# Patient Record
(demographics unavailable — no encounter records)

---

## 2025-05-14 NOTE — PROCEDURE
[1000 Hz] : 1000 Hz [Normal Eardrum Mobility] : consistent with restricted eardrum mobility [Type B Tympanogram] : Type B Flat [] : Auditory Brainstem Response: [___dBnHL] : 4000 Hz: [unfilled] dBnHL [Threshold] : threshold [Natural Sleep] : natural sleep [ABR responses to ___/sec] : responses to [unfilled] /sec [de-identified] : * could not establish threshold above 70 dBnHL

## 2025-05-14 NOTE — PLAN
[FreeTextEntry2] : 1) Otologic evaluation re: middle ear status.  2) Repeat ABR evaluation scheduled for 7/1/25. 3) Further recommendations pending above.

## 2025-05-14 NOTE — BIRTH HISTORY
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
Right knee pain

## 2025-05-14 NOTE — ASSESSMENT
[FreeTextEntry1] : ABR is an an objective test that measures brainstem activity in response to acoustic stimuli. It evaluates the integrity of the hearing system from the level of the cochlea through the lower brainstem. From this, we are able to gather data to estimate hearing thresholds. Please note thresholds are reported in dBnHL. Diagnostic statement includes a correction factor of -20 dB at 500Hz. -15 dB at 1000Hz, -10 dB at 2000Hz and -5 dB at 4000Hz.  RESULTS Left ear: Estimated hearing thresholds obtained WNL to slight/mild hearing loss 2-4kHz Right ear: Estimated thresholds obtained in moderate hearing loss range at 2kHz and at least severe range for 4kHz.   Unable to complete bone conduction testing as patient awoke. Abnormal immittance results, cannot rule out conductive/mixed hearing loss.   Results discussed with parents who expressed understanding.

## 2025-05-14 NOTE — REASON FOR VISIT
[Initial] : initial visit for [Other: _____] : [unfilled]  [FreeTextEntry1] : [facility] [Parents] : parents [Medical Records] : medical records

## 2025-05-16 NOTE — BEGINNING OF VISIT
[Mother] : mother [Father] : father [] :  [Language Line ] : provided by [employer]   [Time Spent: ____ minutes] : Total time spent using  services: [unfilled] minutes. The patient's primary language is not English thus required  services.

## 2025-05-17 NOTE — HISTORY OF PRESENT ILLNESS
[Mother] : mother [Father] : father [Normal] : Normal [___ voids per day] : [unfilled] voids per day [Frequency of stools: ___] : Frequency of stools: [unfilled]  stools [per day] : per day. [Green/brown] : green/brown [In Bassinet/Crib] : sleeps in bassinet/crib [On back] : sleeps on back [Co-sleeping] : no co-sleeping [Loose bedding, pillow, toys, and/or bumpers in crib] : no loose bedding, pillow, toys, and/or bumpers in crib [Pacifier use] : Pacifier use [No] : No cigarette smoke exposure [Exposure to electronic nicotine delivery system] : No exposure to electronic nicotine delivery system [Rear facing car seat in back seat] : Rear facing car seat in back seat [Carbon Monoxide Detectors] : Carbon monoxide detectors at home [Smoke Detectors] : Smoke detectors at home. [de-identified] : parents complain of bad smell when he passes gas [NO] : No [FreeTextEntry7] : Birth Hx and NCIU course:  Born via  due to non-reassuring fetal status and severe intrauterine growth restriction, was admitted to the NICU at birth with complications including hypoxia requiring intubation and vasopressor support. Initial findings included leukopenia, anemia, thrombocytopenia, coagulopathy, brain parenchymal hemorrhage, and marked direct hyperbilirubinemia. A broad workup for liver dysfunction--including infectious, metabolic, and genetic etiologieswas unrevealing, though whole exome sequencing was sent. Imaging ultimately identified two intrahepatic portosystemic shunts, which were successfully embolized on hospital day . Following embolization, there was improvement in liver function tests, coagulopathy, and ammonia levels. Patient was found to have essential fatty acid deficiency, carnitine deficiency. The patient was discharged on  with a feeding plan that included Nestle Extensive HA and expressed human milk, along with ursodiol, a multivitamin, and soybean oil for essential fatty acid supplementation   Meds: Ursodiol, levocarnitine and poly-vi-sol [FreeTextEntry1] : BIRTH HX and NICU COURSE: Date/Time of Admission	17-Mar-2025 10:21 Admission Weight (KILOGRAMS)	1.575 kg Admission Weight (GRAMS)	1575 Gm Date/Time of Birth	09-Mar-2025 Admission Weight (POUNDS)	3 Admission Weight (OUNCES)	7.556 Ounce(s) Admission Height (CENTIMETERS)	42.5 cm Admission Height (INCHES)	16.73 Inch(s) Gestational Age at Birth (WEEKS)	36.6 Week(s) Infant Measurement	Small for gestational age (SGA)  Hospital Course	 OSH: Patient born at 36.6 via C/S to a 32 y.o.  mom. Mom received prenatal care at [facility]. Mom is B+. RPR NR, HIV negative, HBsAg negative, Rubella imm. GC/Chlamydia negative, GBS negative. Mom admitted to L&D for decreased fetal movement of 2 days. BPP 2/10, NRNST. PAtient with FGR <1%. Fluids were meconium stained. Baby emerged limp and apneic. Baby given PPV for apnea and HR <100. Patient then given CPAP 5/40%. Sepsis workup was done and antibiotics were started. Patient intubated at 2HOL. Patient started on SIMV then transitioned to HFOV. Rsoie 20 ppm started on 3/10 and stopped on 3/11. Curosurf given at 3HOL. Dopamine started on 3/9 and stopped on 3/11. Baby extubated to CPAP on 3/11. Transferred on CPAP 6/23%. Patient's HUS on 3/10 showed a focal increased echogenicity in the L occipital region measuring 2x1.3x1.7 cm. HUS on 3/11 showed evidence of intraparenchymal hemorrhage seen in L occipital region. Patient's initial CBC with platlets of 19 for which platelets were given. At 4HOL, hct of 38.9 for which pRBC was given. An additional dose of Vit K given due to excess bleeding at time of UAL insertion. Protein C of 7 and S of 13. Initial wbc of 12 decreased to 2 on 3/10 for which filgrastim was given. Patient given 2nd pRBC on 3/17 for hct of 33.8. Baby started on OGT feeds on 3/13. Feeds stopped on 3/12 for abdominal distention. Flagyl added due to guaiac positive stools. Abdominal xray show no pneumatosis or free air. Patient's initial Dbili of 0.4 increasing daily until it was 15.9 on 3/17. Phototherapy discontinued on 3/17. Albumin given on 3/10 and 3/12. IL never started. The initial BCx was negative. Baby on abx since birth. Amp day 9 of 10, Ceftazidime day 9 of 10, Flagy day 4 since 3/13. Total IgM <25, CMV urine negative, Toxo IgM <8. HepB vaccine given. 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  [facility] COURSE:  GA 36w6d transferred from [facility] for need for hematology, hepatology, and likely other subspecialty expertise Severe FGR; symmetric SGA (CMV and Toxo ruled out) H/o intubation for presumed PPHN L occipital echogenicity suspected to be hemorrhage on head US  Thrombocytopenia, coagulopathy work up notable for elevated INR, protein C and S at [facility] that were reportedly low. Worsening direct hyperbilirubinemia Empiric broad spectrum antibiotics with symptoms focalized to the abdomen however not ruled in for NEC at [facility]    Patient transferred to [facility] NICU on 3/17 for escalation of care and access to in-house subspecialties. Patient currently on nasal IMV 15/6 with PS of 10 at 21%.   *****************************************  Respiratory: Currntly stable on RA since 4/15.  Intubated for OR, CMV PCAC R  25 16/ 21%. Continuous cardiorespiratory monitoring for risk of apnea of prematurity and associated bradycardia.  s/p  BCPAP 6 since 3/18, s/p NIMV on admission 3/17   CV: Hemodynamically stable.  Portosystemic shunt s/p embolization (see Liver).  Echo 4/10 showed small PDA, stretched PFO, no evidence of PPHN, but with evidence of peripheral pulmonary stenosis Echo 3/10 at [facility] showed moderate PPHN, PFO, and small PDA with good L systolic function. H/o intubation for initial critical illness due to presumed PPHN required maximum HFOV, Rosie, dopamine; extubated and off Rosie, dopamine 3/11. Access: none  GI/Liver: Elevated direct bilirubin, LFTs, coagulopathy in setting of portosystemic shunt - liver function now improving s/p embolization, on ursodiol, lactulose. Per Hepatology, can d/c lactulose and discharge home on ursodiol. For follow-up on . 4/10 portosystemic shunt visualized on US Doppler s/p  intervention with IR, closure on . On Ursodiol. Started lactulose on  for high ammonia, dose increased on .   Coags  normal for age.    Hepatology recs appreciated; consulted for concern for liver failure in setting of coagulopathy, thrombocytopenia, direct hyperbilirubinemia. AST/ALT trending up, Dbili/GGT trending down - continue to monitor weekly. UA showed crystals S/p Vitamin K once daily for 3 days (3/17-).  Etiology per Peds GI/hepatology - likely ischemic event vs genetic challenge Labs:  ferritin 1785 (low suspicion for GALD) alpha fetoprotein 90860 (wnl for ; discussed with hepatology) fibrinogen 118, 127 D-dimer 3032 Triglycerides 124 (wnl) Protein C and Protein S activity ordered 3-26, compare to values at [facility] Complete abdominal US with doppler 3/17 showed unremarkable hepatic vasculature, gallbladder and common bile duct.   FEN: Feeding FEHM/Nestle Extensive HA/Neosure 22kcal: ad bobbi, taking 40-70 mL q3hrs. On soybean oil 1mL q6h with feeds for essential fatty acid deficiency on 3/28. Enteral carnitine supplementation started 3/28.   Nutritional insufficiencies, essential fatty acid deficiency and carnitine deficiency on enteral supplements as of 3/28. Occasional emesis.  s/p D10 TPN: TF 25. Recent Lytes 3/28 - acceptable and stable. Essential FA's returned abnormal on 3-25 see nutritionist note. Added ursodiol on 3-26 for direct hyperbili   Heme: Monitor for anemia and thrombocytopenia.  Hct 32%. 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 Will repeat G6PD Level consulted in setting of cerebral parenchymal hemorrhage for diagnostic recommendation for coagulopathy and treatment if applicable.  Coagulopathy c/w consumption, not an inherited disorder is current working dx. Given brain bleed, would transfuse platelets PRN for goal > 50k.    G6PD pending. PRBC transfusion for Hct 25 on .   PRBC transfusion for Hct 22.4 (3/31).  Plt transfusion: 3/17  Coagulopathy workup:   Rpt. Coag WNL ( PT 16.4/PTT46.9 )INR 1.38. Low concern for rapidly progressive coagulopathy.  [x] CBC/diff, reticulocyte count [x] PT, aPTT, fibrinogen, thrombin time, mixing studies, vWD panel (VW antigen, VW factor activity, FVIII assay, blood type) sent 3/18. PT prolonged/corrects on mixing studies. PTT not significantly prolonged per Heme; [X] 3/20 sent obtain factors II, V, VII, X - all low per lab references however deemed acceptable for age per discussion with Hematology [facility] Course:  ID: Monitor for signs and symptoms of sepsis. IT 0.2 on 3/28, incidental finding, no signs or symptoms of sepsis.  Low threshold for a rule out sepsis.  Current:  No tx. Previous tx's: Empiric broad spectrum antibiotics with symptoms focalized to the abdomen however not ruled in for NEC at [facility]   Ampicillin, ceftazidime to end 3/18 after 10-day empiric course started at [facility]; blood culture NGTD Flagyl to end 3/20 after 7-day empiric course started in setting of abdominal distension, Guaiac+ stool cmv pcr, urine, HSV pcr, BCx, UCx,   Neuro: L occipital hemorrhage suggested by echogenicity on head US.  MRI  imaging 3-21 pm. No need for repeat MRI prior to discharge. Outpatient Neurology f/u 1 mo from discharge; NSGY f/u 2 weeks from discharge.  MRI brain w/wo contrast, MRA head and neck w contrast - see report - significant old bleeding and white matter injury evident Neurology recs appreciated. Consult Neurosurgery.- see notes. No advancing hemorrhage, no hydrocephalus Serial HUS's, last  with improved left occipital hemorrhage   Audiology: Failed in right ear, CMV negative x2.   Genetics/Metabolic: consulted for diagnostic recommendation. 3/19 sent: - THEA on 3-19 Results pending - biochem workup including: plasma AA, acylcarnitine panel, urine organic acid, peroxisomal fatty acids    sent 3-18; Results...3-, tyrosine elevated, see report on f/u labs _____________ - Sent carbohydrate deficiency transferrin (3/31) - Sent TALDO - Polyols, Quantitative Urine. ()  Social:   Family updated . Family updated using Creole  (4/3)  Scheduled family meeting for 1600 for multispecialty discussion.[id number] [facility] creole Dr Álvarez, Prabha *Peds Neuro; Dr CALLEJAS---Palliative care, ; Dr. Fernando Agarwal Neonatology, [facility], Start 1645, end 1756 hrs.  Labs/Imaging/Studies:  essential fatty acid panel, LFTs, GGT, total and direct bili. Repeat Brain MRI week of . Follow up essential fatty acid panel.   Plan: Cleared for discharge home by Hepatology, Neurology, Neurosurgery and Hematology as of  with close outpatient follow-up. Meds to be sent and parents need teaching. Possible d/c home  pending family receipt of medication and med teaching.   CCHD Screen []: Initial Pre-Ductal SpO2(%): 98 Post-Ductal SpO2(%): 97 SpO2 Difference(Pre MINUS Post): 1 Extremities Used: Right Hand, Left Foot Result: Passed Follow up: Normal Screen- (No follow-up needed) Screen#: [id number] Screen Date: 12-Mar-2025 Screen Comment: repeated 3/13/25; #[id number] Car seat test passed: yes Car seat test date: 2025 Car seat test comments: passed - no desaturations or bradycardia. Safety 1st car seat   Right ear hearing screen completed date: 2025 Right ear screen method: ABR (auditory brainstem response) Right ear screen result: Failed Right ear screen comment: Audiology to schedule outpatient followup  Left ear hearing screen completed date: 2025 Left ear screen method: ABR (auditory brainstem response) Left ear screen result: Passed Left ear screen comments: N/A    Theodore is a 2 month old ex-36.6 week male with extensive NICU admission presenting for his first [facility]. Today parents report that they are most concerned with his umbilical hernia that seems to be worsening. They report that they went to the emergency department on  and was sent home and told to follow up with the pediatrician. In addition, they are concerned that Theodore is very small and he is always hungry.   For feeds, he takes 180 mL every 3 hours. They report that he often wants more but they are hesitant to feed him more as when he was in the NICU he took a lot less. They also endorse being concerned that they will run out of the formula very soon. They are mixing the formula as taught in the NICU (250 mL water with 10 scoops of nestle Extensive HA formula 24 kcal with soybean oil in every other feed).   Mom reports that she is concerned because Theodore does not sleep much at all and in turn her and father of child do not sleep much either. They say the longest stretch he has slept since being home from the NICU is 1 hour, typically from 4-5 am. Otherwise he is very fussy and cries often. They feel that this pain is from the hernia because when he cries it enlarges.   Mom endorses post partum depression in addition to lack of sleep. She reports that she cries very often but has no intention of harming herself or Theodore. She reports feeling a significant amount of guilt every time Theodore cries.   They report that he has 2-3 green bowel movements a day and passes foul smelling gas. He has 8-10 wet diapers per day. They endorse that he sleeping on his back in a crib but report that he uses a pillow in the crib.

## 2025-05-17 NOTE — PHYSICAL EXAM
[Alert] : alert [Consolable] : consolable [Normocephalic] : normocephalic [Flat Open Anterior [place]] : flat open anterior fontanelle [PERRL] : PERRL [Red Reflex Bilateral] : red reflex bilateral [Symmetric Light Reflex] : symmetric light reflex [Normally Placed Ears] : normally placed ears [Auricles Well Formed] : auricles well formed [Bony landmarks visible] : bony landmarks visible [Nares Patent] : nares patent [Palate Intact] : palate intact [Supple, full passive range of motion] : supple, full passive range of motion [Symmetric Chest Rise] : symmetric chest rise [Clear to Auscultation Bilaterally] : clear to auscultation bilaterally [Regular Rate and Rhythm] : regular rate and rhythm [S1, S2 present] : S1, S2 present [+2 Femoral Pulses] : +2 femoral pulses [Soft] : soft [Bowel Sounds] : bowel sounds present [Normal external genitailia] : normal external genitalia [Central Urethral Opening] : central urethral opening [Testicles Descended Bilaterally] : testicles descended bilaterally [Patent] : patent [Normally Placed] : normally placed [Symmetric Flexed Extremities] : symmetric flexed extremities [Startle Reflex] : startle reflex present [Suck Reflex] : suck reflex present [Rooting] : rooting reflex present [Palmar Grasp] : palmar grasp reflex present [Plantar Grasp] : plantar grasp reflex present [Symmetric [place]] : symmetric [place] [Dermal Melanocytosis] : Dermal Melanocytosis [Acute Distress] : no acute distress [Conjunctivae with no discharge] : conjunctivae with discharge [Excess Tearing] : no excessive tearing [Discharge] : no discharge [Palpable Masses] : no palpable masses [Murmurs] : no murmurs [Tender] : nontender [Circumcised] : not circumcised [Aguilar-Ortolani] : negative Aguilar-Ortolani [Spinal Dimple] : no spinal dimple [Tuft of Hair] : no tuft of hair [Rash and/or lesion present] : no rash/lesion [FreeTextEntry1] : small appearing 2 month old male [FreeTextEntry5] : +icteric sclera [FreeTextEntry9] : + distended abdomen, large umbilical hernia that is reducible, no hepatosplenomegaly appreciated  [FreeTextEntry6] : +left ingluinal hernia

## 2025-05-17 NOTE — DISCUSSION/SUMMARY
[FreeTextEntry1] : Theodore is a 2 month old male with extensive NICU stay. He has gained 15 g/day since he was discharged from the hospital from his second hospitalization. Parents state they are low on special formula required for this infant's metabolic illness and receiving wrong WIC form at discharge. WIC form provided for family to receive more formula. Proper preparation reviewed in detail.  Parents have been having difficulty coordinating follow up appointments with the many specialties that he requires. [facility] referral submitted. He requires follow up with several specialists at this time including hepatology, surgery, neurosurgery, genetics, neurology, neurodevelopmental, otolaryngology, audiology, hematology and NICU grad clinic. Only a couple of these were scheduled at the time of discharge. Since being discharged from the hospital the family was unable to attend the hepatology appointment because father's work schedule and mom is unable to attend appointments alone. This appointment was never rescheduled.   [facility] survey indicating family requesting assistance with , have worries about housing, need assistance obtaining more formula, SNAP and cash assistance. Patient navigator provided family resources to assist them.   connected with patients as well and initiated contact for assistance as well. Provided family with resources for mom's depression and sadness. Mom denies any thoughts of harming herself or others.  Discussed the importance of safe sleep guidelines and informed the family of the dangers of co-sleeping and sleeping with a pillow at this age.  Discussed with parents that the umbilical and inguinal hernias are both reducible and don't seem to be causing any pain when being reduced. Parents insist he be seen soon for hernia repair.  Discussed that Theodore needs to be fed and increased intervals and ideally should be fed ad bobbi to optimize his growth as well as aid in fussiness and assist with sleep, all likely in the setting of not being full.  Theodore failed both hearing screens at birth.  He has follow up with nadia and hearing and speech.   Theodore has many specialities of which care coordination is required. Hepatology and surgery appointments will be prioritized, as Theodore continues to have icteric sclera and have been unable to attend their hepatology appointment and surgery because of the families concerns for the hernia.  Follow up in 1-2 weeks to discuss 2 month vaccines and continue to coordinate care for Theodore. He did not receive first dose of HepB or any 2 month vaccines prior to discharge.  Will tailor vaccine schedule to include this dose.

## 2025-05-17 NOTE — DISCUSSION/SUMMARY
[FreeTextEntry1] : Theodore is a 2 month old male with extensive NICU stay. He has gained 15 g/day since he was discharged from the hospital from his second hospitalization. Parents state they are low on special formula required for this infant's metabolic illness and receiving wrong WIC form at discharge. WIC form provided for family to receive more formula. Proper preparation reviewed in detail.  Parents have been having difficulty coordinating follow up appointments with the many specialties that he requires. [facility] referral submitted. He requires follow up with several specialists at this time including hepatology, surgery, neurosurgery, genetics, neurology, neurodevelopmental, otolaryngology, audiology, hematology and NICU grad clinic. Only a couple of these were scheduled at the time of discharge. Since being discharged from the hospital the family was unable to attend the hepatology appointment because father's work schedule and mom is unable to attend appointments alone. This appointment was never rescheduled.   [facility] survey indicating family requesting assistance with , have worries about housing, need assistance obtaining more formula, SNAP and cash assistance. Patient navigator provided family resources to assist them.   connected with patients as well and initiated contact for assistance as well. Provided family with resources for mom's depression and sadness. Mom denies any thoughts of harming herself or others.  Discussed the importance of safe sleep guidelines and informed the family of the dangers of co-sleeping and sleeping with a pillow at this age.  Discussed with parents that the umbilical and inguinal hernias are both reducible and don't seem to be causing any pain when being reduced. Parents insist he be seen soon for hernia repair.  Discussed that Theodore needs to be fed and increased intervals and ideally should be fed ad bobbi to optimize his growth as well as aid in fussiness and assist with sleep, all likely in the setting of not being full.  Theodore failed both hearing screens at birth.  He has follow up with nadia and hearing and speech.   Theodore has many specialities of which care coordination is required. Hepatology and surgery appointments will be prioritized, as Theodore continues to have icteric sclera and have been unable to attend their hepatology appointment and surgery because of the families concerns for the hernia.  Follow up in 1-2 weeks to discuss 2 month vaccines and continue to coordinate care for Theodore. He did not receive first dose of HepB or any 2 month vaccines prior to discharge.  Will tailor vaccine schedule to include this dose.

## 2025-05-17 NOTE — PHYSICAL EXAM
[Alert] : alert [Consolable] : consolable [Normocephalic] : normocephalic [Flat Open Anterior [place]] : flat open anterior fontanelle [PERRL] : PERRL [Red Reflex Bilateral] : red reflex bilateral [Symmetric Light Reflex] : symmetric light reflex [Normally Placed Ears] : normally placed ears [Auricles Well Formed] : auricles well formed [Bony landmarks visible] : bony landmarks visible [Nares Patent] : nares patent [Palate Intact] : palate intact [Supple, full passive range of motion] : supple, full passive range of motion [Symmetric Chest Rise] : symmetric chest rise [Clear to Auscultation Bilaterally] : clear to auscultation bilaterally [Regular Rate and Rhythm] : regular rate and rhythm [S1, S2 present] : S1, S2 present [+2 Femoral Pulses] : +2 femoral pulses [Soft] : soft [Bowel Sounds] : bowel sounds present [Normal external genitailia] : normal external genitalia [Central Urethral Opening] : central urethral opening [Testicles Descended Bilaterally] : testicles descended bilaterally [Patent] : patent [Normally Placed] : normally placed [Symmetric Flexed Extremities] : symmetric flexed extremities [Startle Reflex] : startle reflex present [Suck Reflex] : suck reflex present [Rooting] : rooting reflex present [Palmar Grasp] : palmar grasp reflex present [Plantar Grasp] : plantar grasp reflex present [Symmetric Zeny] : symmetric [place] [Dermal Melanocytosis] : Dermal Melanocytosis [Acute Distress] : no acute distress [Conjunctivae with no discharge] : conjunctivae with discharge [Excess Tearing] : no excessive tearing [Discharge] : no discharge [Palpable Masses] : no palpable masses [Murmurs] : no murmurs [Tender] : nontender [Circumcised] : not circumcised [Aguilar-Ortolani] : negative Aguilar-Ortolani [Spinal Dimple] : no spinal dimple [Tuft of Hair] : no tuft of hair [Rash and/or lesion present] : no rash/lesion [FreeTextEntry1] : small appearing 2 month old male [FreeTextEntry5] : +icteric sclera [FreeTextEntry9] : + distended abdomen, large umbilical hernia that is reducible, no hepatosplenomegaly appreciated  [FreeTextEntry6] : +left ingluinal hernia

## 2025-05-17 NOTE — DEVELOPMENTAL MILESTONES
[Calms when picked up or spoken to] : calms when picked up or spoken to [Looks briefly at objects] : looks briefly at objects [Alerts to unexpected sound] : alerts to unexpected sound [Holds fingers more open at rest] : holds fingers more open at rest [None] : none [Smiles responsively] : smiles responsively [Opens and shuts hands] : opens and shuts hands [Vocalizes with simple cooing] : does not vocalize with simple cooing [Lifts head and chest in prone] : does not lift head and chest in prone

## 2025-05-17 NOTE — HISTORY OF PRESENT ILLNESS
[Mother] : mother [Father] : father [Normal] : Normal [___ voids per day] : [unfilled] voids per day [Frequency of stools: ___] : Frequency of stools: [unfilled]  stools [per day] : per day. [Green/brown] : green/brown [In Bassinet/Crib] : sleeps in bassinet/crib [On back] : sleeps on back [Co-sleeping] : no co-sleeping [Loose bedding, pillow, toys, and/or bumpers in crib] : no loose bedding, pillow, toys, and/or bumpers in crib [Pacifier use] : Pacifier use [No] : No cigarette smoke exposure [Exposure to electronic nicotine delivery system] : No exposure to electronic nicotine delivery system [Rear facing car seat in back seat] : Rear facing car seat in back seat [Carbon Monoxide Detectors] : Carbon monoxide detectors at home [Smoke Detectors] : Smoke detectors at home. [de-identified] : parents complain of bad smell when he passes gas [NO] : No [FreeTextEntry7] : Birth Hx and NCIU course:  Born via  due to non-reassuring fetal status and severe intrauterine growth restriction, was admitted to the NICU at birth with complications including hypoxia requiring intubation and vasopressor support. Initial findings included leukopenia, anemia, thrombocytopenia, coagulopathy, brain parenchymal hemorrhage, and marked direct hyperbilirubinemia. A broad workup for liver dysfunction--including infectious, metabolic, and genetic etiologieswas unrevealing, though whole exome sequencing was sent. Imaging ultimately identified two intrahepatic portosystemic shunts, which were successfully embolized on hospital day . Following embolization, there was improvement in liver function tests, coagulopathy, and ammonia levels. Patient was found to have essential fatty acid deficiency, carnitine deficiency. The patient was discharged on  with a feeding plan that included Nestle Extensive HA and expressed human milk, along with ursodiol, a multivitamin, and soybean oil for essential fatty acid supplementation   Meds: Ursodiol, levocarnitine and poly-vi-sol [FreeTextEntry1] : BIRTH HX and NICU COURSE: Date/Time of Admission	17-Mar-2025 10:21 Admission Weight (KILOGRAMS)	1.575 kg Admission Weight (GRAMS)	1575 Gm Date/Time of Birth	09-Mar-2025 Admission Weight (POUNDS)	3 Admission Weight (OUNCES)	7.556 Ounce(s) Admission Height (CENTIMETERS)	42.5 cm Admission Height (INCHES)	16.73 Inch(s) Gestational Age at Birth (WEEKS)	36.6 Week(s) Infant Measurement	Small for gestational age (SGA)  Hospital Course	 OSH: Patient born at 36.6 via C/S to a 32 y.o.  mom. Mom received prenatal care at [facility]. Mom is B+. RPR NR, HIV negative, HBsAg negative, Rubella imm. GC/Chlamydia negative, GBS negative. Mom admitted to L&D for decreased fetal movement of 2 days. BPP 2/10, NRNST. PAtient with FGR <1%. Fluids were meconium stained. Baby emerged limp and apneic. Baby given PPV for apnea and HR <100. Patient then given CPAP 5/40%. Sepsis workup was done and antibiotics were started. Patient intubated at 2HOL. Patient started on SIMV then transitioned to HFOV. Rosie 20 ppm started on 3/10 and stopped on 3/11. Curosurf given at 3HOL. Dopamine started on 3/9 and stopped on 3/11. Baby extubated to CPAP on 3/11. Transferred on CPAP 6/23%. Patient's HUS on 3/10 showed a focal increased echogenicity in the L occipital region measuring 2x1.3x1.7 cm. HUS on 3/11 showed evidence of intraparenchymal hemorrhage seen in L occipital region. Patient's initial CBC with platlets of 19 for which platelets were given. At 4HOL, hct of 38.9 for which pRBC was given. An additional dose of Vit K given due to excess bleeding at time of UAL insertion. Protein C of 7 and S of 13. Initial wbc of 12 decreased to 2 on 3/10 for which filgrastim was given. Patient given 2nd pRBC on 3/17 for hct of 33.8. Baby started on OGT feeds on 3/13. Feeds stopped on 3/12 for abdominal distention. Flagyl added due to guaiac positive stools. Abdominal xray show no pneumatosis or free air. Patient's initial Dbili of 0.4 increasing daily until it was 15.9 on 3/17. Phototherapy discontinued on 3/17. Albumin given on 3/10 and 3/12. IL never started. The initial BCx was negative. Baby on abx since birth. Amp day 9 of 10, Ceftazidime day 9 of 10, Flagy day 4 since 3/13. Total IgM <25, CMV urine negative, Toxo IgM <8. HepB vaccine given. 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  [facility] COURSE:  GA 36w6d transferred from [facility] for need for hematology, hepatology, and likely other subspecialty expertise Severe FGR; symmetric SGA (CMV and Toxo ruled out) H/o intubation for presumed PPHN L occipital echogenicity suspected to be hemorrhage on head US  Thrombocytopenia, coagulopathy work up notable for elevated INR, protein C and S at [facility] that were reportedly low. Worsening direct hyperbilirubinemia Empiric broad spectrum antibiotics with symptoms focalized to the abdomen however not ruled in for NEC at [facility]    Patient transferred to [facility] NICU on 3/17 for escalation of care and access to in-house subspecialties. Patient currently on nasal IMV 15/6 with PS of 10 at 21%.   *****************************************  Respiratory: Currntly stable on RA since 4/15.  Intubated for OR, CMV PCAC R  25 16/ 21%. Continuous cardiorespiratory monitoring for risk of apnea of prematurity and associated bradycardia.  s/p  BCPAP 6 since 3/18, s/p NIMV on admission 3/17   CV: Hemodynamically stable.  Portosystemic shunt s/p embolization (see Liver).  Echo 4/10 showed small PDA, stretched PFO, no evidence of PPHN, but with evidence of peripheral pulmonary stenosis Echo 3/10 at [facility] showed moderate PPHN, PFO, and small PDA with good L systolic function. H/o intubation for initial critical illness due to presumed PPHN required maximum HFOV, Rosie, dopamine; extubated and off Rosie, dopamine 3/11. Access: none  GI/Liver: Elevated direct bilirubin, LFTs, coagulopathy in setting of portosystemic shunt - liver function now improving s/p embolization, on ursodiol, lactulose. Per Hepatology, can d/c lactulose and discharge home on ursodiol. For follow-up on . 4/10 portosystemic shunt visualized on US Doppler s/p  intervention with IR, closure on . On Ursodiol. Started lactulose on  for high ammonia, dose increased on .   Coags  normal for age.    Hepatology recs appreciated; consulted for concern for liver failure in setting of coagulopathy, thrombocytopenia, direct hyperbilirubinemia. AST/ALT trending up, Dbili/GGT trending down - continue to monitor weekly. UA showed crystals S/p Vitamin K once daily for 3 days (3/17-).  Etiology per Peds GI/hepatology - likely ischemic event vs genetic challenge Labs:  ferritin 1785 (low suspicion for GALD) alpha fetoprotein 64766 (wnl for ; discussed with hepatology) fibrinogen 118, 127 D-dimer 3032 Triglycerides 124 (wnl) Protein C and Protein S activity ordered 3-26, compare to values at [facility] Complete abdominal US with doppler 3/17 showed unremarkable hepatic vasculature, gallbladder and common bile duct.   FEN: Feeding FEHM/Nestle Extensive HA/Neosure 22kcal: ad bobbi, taking 40-70 mL q3hrs. On soybean oil 1mL q6h with feeds for essential fatty acid deficiency on 3/28. Enteral carnitine supplementation started 3/28.   Nutritional insufficiencies, essential fatty acid deficiency and carnitine deficiency on enteral supplements as of 3/28. Occasional emesis.  s/p D10 TPN: TF 25. Recent Lytes 3/28 - acceptable and stable. Essential FA's returned abnormal on 3-25 see nutritionist note. Added ursodiol on 3-26 for direct hyperbili   Heme: Monitor for anemia and thrombocytopenia.  Hct 32%. 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 Will repeat G6PD Level consulted in setting of cerebral parenchymal hemorrhage for diagnostic recommendation for coagulopathy and treatment if applicable.  Coagulopathy c/w consumption, not an inherited disorder is current working dx. Given brain bleed, would transfuse platelets PRN for goal > 50k.    G6PD pending. PRBC transfusion for Hct 25 on .   PRBC transfusion for Hct 22.4 (3/31).  Plt transfusion: 3/17  Coagulopathy workup:   Rpt. Coag WNL ( PT 16.4/PTT46.9 )INR 1.38. Low concern for rapidly progressive coagulopathy.  [x] CBC/diff, reticulocyte count [x] PT, aPTT, fibrinogen, thrombin time, mixing studies, vWD panel (VW antigen, VW factor activity, FVIII assay, blood type) sent 3/18. PT prolonged/corrects on mixing studies. PTT not significantly prolonged per Heme; [X] 3/20 sent obtain factors II, V, VII, X - all low per lab references however deemed acceptable for age per discussion with Hematology [facility] Course:  ID: Monitor for signs and symptoms of sepsis. IT 0.2 on 3/28, incidental finding, no signs or symptoms of sepsis.  Low threshold for a rule out sepsis.  Current:  No tx. Previous tx's: Empiric broad spectrum antibiotics with symptoms focalized to the abdomen however not ruled in for NEC at [facility]   Ampicillin, ceftazidime to end 3/18 after 10-day empiric course started at [facility]; blood culture NGTD Flagyl to end 3/20 after 7-day empiric course started in setting of abdominal distension, Guaiac+ stool cmv pcr, urine, HSV pcr, BCx, UCx,   Neuro: L occipital hemorrhage suggested by echogenicity on head US.  MRI  imaging 3-21 pm. No need for repeat MRI prior to discharge. Outpatient Neurology f/u 1 mo from discharge; NSGY f/u 2 weeks from discharge.  MRI brain w/wo contrast, MRA head and neck w contrast - see report - significant old bleeding and white matter injury evident Neurology recs appreciated. Consult Neurosurgery.- see notes. No advancing hemorrhage, no hydrocephalus Serial HUS's, last  with improved left occipital hemorrhage   Audiology: Failed in right ear, CMV negative x2.   Genetics/Metabolic: consulted for diagnostic recommendation. 3/19 sent: - THEA on 3-19 Results pending - biochem workup including: plasma AA, acylcarnitine panel, urine organic acid, peroxisomal fatty acids    sent 3-18; Results...3-, tyrosine elevated, see report on f/u labs _____________ - Sent carbohydrate deficiency transferrin (3/31) - Sent TALDO - Polyols, Quantitative Urine. ()  Social:   Family updated . Family updated using Creole  (4/3)  Scheduled family meeting for 1600 for multispecialty discussion.[id number] [facility] creole Dr Álvarez, Prabha *Peds Neuro; Dr CALLEJAS---Palliative care, ; Dr. Fernando Agarwal Neonatology, [facility], Start 1645, end 1756 hrs.  Labs/Imaging/Studies:  essential fatty acid panel, LFTs, GGT, total and direct bili. Repeat Brain MRI week of . Follow up essential fatty acid panel.   Plan: Cleared for discharge home by Hepatology, Neurology, Neurosurgery and Hematology as of  with close outpatient follow-up. Meds to be sent and parents need teaching. Possible d/c home  pending family receipt of medication and med teaching.   CCHD Screen []: Initial Pre-Ductal SpO2(%): 98 Post-Ductal SpO2(%): 97 SpO2 Difference(Pre MINUS Post): 1 Extremities Used: Right Hand, Left Foot Result: Passed Follow up: Normal Screen- (No follow-up needed) Screen#: [id number] Screen Date: 12-Mar-2025 Screen Comment: repeated 3/13/25; #[id number] Car seat test passed: yes Car seat test date: 2025 Car seat test comments: passed - no desaturations or bradycardia. Safety 1st car seat   Right ear hearing screen completed date: 2025 Right ear screen method: ABR (auditory brainstem response) Right ear screen result: Failed Right ear screen comment: Audiology to schedule outpatient followup  Left ear hearing screen completed date: 2025 Left ear screen method: ABR (auditory brainstem response) Left ear screen result: Passed Left ear screen comments: N/A    Theodore is a 2 month old ex-36.6 week male with extensive NICU admission presenting for his first [facility]. Today parents report that they are most concerned with his umbilical hernia that seems to be worsening. They report that they went to the emergency department on  and was sent home and told to follow up with the pediatrician. In addition, they are concerned that Theodore is very small and he is always hungry.   For feeds, he takes 180 mL every 3 hours. They report that he often wants more but they are hesitant to feed him more as when he was in the NICU he took a lot less. They also endorse being concerned that they will run out of the formula very soon. They are mixing the formula as taught in the NICU (250 mL water with 10 scoops of nestle Extensive HA formula 24 kcal with soybean oil in every other feed).   Mom reports that she is concerned because Theodore does not sleep much at all and in turn her and father of child do not sleep much either. They say the longest stretch he has slept since being home from the NICU is 1 hour, typically from 4-5 am. Otherwise he is very fussy and cries often. They feel that this pain is from the hernia because when he cries it enlarges.   Mom endorses post partum depression in addition to lack of sleep. She reports that she cries very often but has no intention of harming herself or Theodore. She reports feeling a significant amount of guilt every time Theodore cries.   They report that he has 2-3 green bowel movements a day and passes foul smelling gas. He has 8-10 wet diapers per day. They endorse that he sleeping on his back in a crib but report that he uses a pillow in the crib.

## 2025-05-17 NOTE — DISCUSSION/SUMMARY
[FreeTextEntry1] : Theodore is a 2 month old male with extensive NICU stay. He has gained 15 g/day since he was discharged from the hospital from his second hospitalization. Parents state they are low on special formula required for this infant's metabolic illness and receiving wrong WIC form at discharge. WIC form provided for family to receive more formula. Proper preparation reviewed in detail.  Parents have been having difficulty coordinating follow up appointments with the many specialties that he requires. [facility] referral submitted. He requires follow up with several specialists at this time including hepatology, surgery, neurosurgery, genetics, neurology, neurodevelopmental, otolaryngology, audiology, hematology and NICU grad clinic. Only a couple of these were scheduled at the time of discharge. Since being discharged from the hospital the family was unable to attend the hepatology appointment because father's work schedule and mom is unable to attend appointments alone. This appointment was never rescheduled.   [facility] survey indicating family requesting assistance with , have worries about housing, need assistance obtaining more formula, SNAP and cash assistance. Patient navigator provided family resources to assist them.   connected with patients as well and initiated contact for assistance as well. Provided family with resources for mom's depression and sadness. Mom denies any thoughts of harming herself or others.  Discussed the importance of safe sleep guidelines and informed the family of the dangers of co-sleeping and sleeping with a pillow at this age.  Discussed with parents that the umbilical and inguinal hernias are both reducible and don't seem to be causing any pain when being reduced. Parents insist he be seen soon for hernia repair.  Discussed that Theodore needs to be fed and increased intervals and ideally should be fed ad bobbi to optimize his growth as well as aid in fussiness and assist with sleep, all likely in the setting of not being full.  Theodore failed both hearing screens at birth.  He has follow up with nadia and hearing and speech.   Theodore has many specialities of which care coordination is required. Hepatology and surgery appointments will be prioritized, as Theodore continues to have icteric sclera and have been unable to attend their hepatology appointment and surgery because of the families concerns for the hernia.  Follow up in 1-2 weeks to discuss 2 month vaccines and continue to coordinate care for Theodore. He did not receive first dose of HepB or any 2 month vaccines prior to discharge.  Will tailor vaccine schedule to include this dose.

## 2025-05-17 NOTE — PHYSICAL EXAM
[Alert] : alert [Consolable] : consolable [Normocephalic] : normocephalic [Flat Open Anterior [place]] : flat open anterior fontanelle [PERRL] : PERRL [Red Reflex Bilateral] : red reflex bilateral [Symmetric Light Reflex] : symmetric light reflex [Normally Placed Ears] : normally placed ears [Auricles Well Formed] : auricles well formed [Bony landmarks visible] : bony landmarks visible [Nares Patent] : nares patent [Palate Intact] : palate intact [Supple, full passive range of motion] : supple, full passive range of motion [Symmetric Chest Rise] : symmetric chest rise [Clear to Auscultation Bilaterally] : clear to auscultation bilaterally [Regular Rate and Rhythm] : regular rate and rhythm [S1, S2 present] : S1, S2 present [+2 Femoral Pulses] : +2 femoral pulses [Soft] : soft [Bowel Sounds] : bowel sounds present [Normal external genitailia] : normal external genitalia [Central Urethral Opening] : central urethral opening [Testicles Descended Bilaterally] : testicles descended bilaterally [Patent] : patent [Normally Placed] : normally placed [Symmetric Flexed Extremities] : symmetric flexed extremities [Startle Reflex] : startle reflex present [Suck Reflex] : suck reflex present [Rooting] : rooting reflex present [Palmar Grasp] : palmar grasp reflex present [Plantar Grasp] : plantar grasp reflex present [Symmetric Zeny] : symmetric [place] [Dermal Melanocytosis] : Dermal Melanocytosis [Acute Distress] : no acute distress [Conjunctivae with no discharge] : conjunctivae with discharge [Excess Tearing] : no excessive tearing [Discharge] : no discharge [Palpable Masses] : no palpable masses [Murmurs] : no murmurs [Tender] : nontender [Circumcised] : not circumcised [Aguilar-Ortolani] : negative Aguilar-Ortolani [Spinal Dimple] : no spinal dimple [Tuft of Hair] : no tuft of hair [Rash and/or lesion present] : no rash/lesion [FreeTextEntry1] : small appearing 2 month old male [FreeTextEntry5] : +icteric sclera [FreeTextEntry9] : + distended abdomen, large umbilical hernia that is reducible, no hepatosplenomegaly appreciated  [FreeTextEntry6] : +left ingluinal hernia

## 2025-05-17 NOTE — COUNSELING
[Use of Plain Language] : use of plain language [Needs Reinforcement, Referred] : needs reinforcement, referred [Health Literacy] : health literacy [Transportation] : transportation [Cultural] : cultural [FreeTextEntry1] : Use of interpretor [FreeTextEntry2] : Extensive gaps in health literacy and lack of education on child's condition prior to discharge from the NICU. Parents do not seem to understand the child's current illness.

## 2025-05-17 NOTE — HISTORY OF PRESENT ILLNESS
[Mother] : mother [Father] : father [Normal] : Normal [___ voids per day] : [unfilled] voids per day [Frequency of stools: ___] : Frequency of stools: [unfilled]  stools [per day] : per day. [Green/brown] : green/brown [In Bassinet/Crib] : sleeps in bassinet/crib [On back] : sleeps on back [Co-sleeping] : no co-sleeping [Loose bedding, pillow, toys, and/or bumpers in crib] : no loose bedding, pillow, toys, and/or bumpers in crib [Pacifier use] : Pacifier use [No] : No cigarette smoke exposure [Exposure to electronic nicotine delivery system] : No exposure to electronic nicotine delivery system [Rear facing car seat in back seat] : Rear facing car seat in back seat [Carbon Monoxide Detectors] : Carbon monoxide detectors at home [Smoke Detectors] : Smoke detectors at home. [de-identified] : parents complain of bad smell when he passes gas [NO] : No [FreeTextEntry7] : Birth Hx and NCIU course:  Born via  due to non-reassuring fetal status and severe intrauterine growth restriction, was admitted to the NICU at birth with complications including hypoxia requiring intubation and vasopressor support. Initial findings included leukopenia, anemia, thrombocytopenia, coagulopathy, brain parenchymal hemorrhage, and marked direct hyperbilirubinemia. A broad workup for liver dysfunction--including infectious, metabolic, and genetic etiologieswas unrevealing, though whole exome sequencing was sent. Imaging ultimately identified two intrahepatic portosystemic shunts, which were successfully embolized on hospital day . Following embolization, there was improvement in liver function tests, coagulopathy, and ammonia levels. Patient was found to have essential fatty acid deficiency, carnitine deficiency. The patient was discharged on  with a feeding plan that included Nestle Extensive HA and expressed human milk, along with ursodiol, a multivitamin, and soybean oil for essential fatty acid supplementation   Meds: Ursodiol, levocarnitine and poly-vi-sol [FreeTextEntry1] : BIRTH HX and NICU COURSE: Date/Time of Admission	17-Mar-2025 10:21 Admission Weight (KILOGRAMS)	1.575 kg Admission Weight (GRAMS)	1575 Gm Date/Time of Birth	09-Mar-2025 Admission Weight (POUNDS)	3 Admission Weight (OUNCES)	7.556 Ounce(s) Admission Height (CENTIMETERS)	42.5 cm Admission Height (INCHES)	16.73 Inch(s) Gestational Age at Birth (WEEKS)	36.6 Week(s) Infant Measurement	Small for gestational age (SGA)  Hospital Course	 OSH: Patient born at 36.6 via C/S to a 32 y.o.  mom. Mom received prenatal care at [facility]. Mom is B+. RPR NR, HIV negative, HBsAg negative, Rubella imm. GC/Chlamydia negative, GBS negative. Mom admitted to L&D for decreased fetal movement of 2 days. BPP 2/10, NRNST. PAtient with FGR <1%. Fluids were meconium stained. Baby emerged limp and apneic. Baby given PPV for apnea and HR <100. Patient then given CPAP 5/40%. Sepsis workup was done and antibiotics were started. Patient intubated at 2HOL. Patient started on SIMV then transitioned to HFOV. Rosie 20 ppm started on 3/10 and stopped on 3/11. Curosurf given at 3HOL. Dopamine started on 3/9 and stopped on 3/11. Baby extubated to CPAP on 3/11. Transferred on CPAP 6/23%. Patient's HUS on 3/10 showed a focal increased echogenicity in the L occipital region measuring 2x1.3x1.7 cm. HUS on 3/11 showed evidence of intraparenchymal hemorrhage seen in L occipital region. Patient's initial CBC with platlets of 19 for which platelets were given. At 4HOL, hct of 38.9 for which pRBC was given. An additional dose of Vit K given due to excess bleeding at time of UAL insertion. Protein C of 7 and S of 13. Initial wbc of 12 decreased to 2 on 3/10 for which filgrastim was given. Patient given 2nd pRBC on 3/17 for hct of 33.8. Baby started on OGT feeds on 3/13. Feeds stopped on 3/12 for abdominal distention. Flagyl added due to guaiac positive stools. Abdominal xray show no pneumatosis or free air. Patient's initial Dbili of 0.4 increasing daily until it was 15.9 on 3/17. Phototherapy discontinued on 3/17. Albumin given on 3/10 and 3/12. IL never started. The initial BCx was negative. Baby on abx since birth. Amp day 9 of 10, Ceftazidime day 9 of 10, Flagy day 4 since 3/13. Total IgM <25, CMV urine negative, Toxo IgM <8. HepB vaccine given. 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  [facility] COURSE:  GA 36w6d transferred from [facility] for need for hematology, hepatology, and likely other subspecialty expertise Severe FGR; symmetric SGA (CMV and Toxo ruled out) H/o intubation for presumed PPHN L occipital echogenicity suspected to be hemorrhage on head US  Thrombocytopenia, coagulopathy work up notable for elevated INR, protein C and S at [facility] that were reportedly low. Worsening direct hyperbilirubinemia Empiric broad spectrum antibiotics with symptoms focalized to the abdomen however not ruled in for NEC at [facility]    Patient transferred to [facility] NICU on 3/17 for escalation of care and access to in-house subspecialties. Patient currently on nasal IMV 15/6 with PS of 10 at 21%.   *****************************************  Respiratory: Currntly stable on RA since 4/15.  Intubated for OR, CMV PCAC R  25 16/ 21%. Continuous cardiorespiratory monitoring for risk of apnea of prematurity and associated bradycardia.  s/p  BCPAP 6 since 3/18, s/p NIMV on admission 3/17   CV: Hemodynamically stable.  Portosystemic shunt s/p embolization (see Liver).  Echo 4/10 showed small PDA, stretched PFO, no evidence of PPHN, but with evidence of peripheral pulmonary stenosis Echo 3/10 at [facility] showed moderate PPHN, PFO, and small PDA with good L systolic function. H/o intubation for initial critical illness due to presumed PPHN required maximum HFOV, Rosie, dopamine; extubated and off Rosie, dopamine 3/11. Access: none  GI/Liver: Elevated direct bilirubin, LFTs, coagulopathy in setting of portosystemic shunt - liver function now improving s/p embolization, on ursodiol, lactulose. Per Hepatology, can d/c lactulose and discharge home on ursodiol. For follow-up on . 4/10 portosystemic shunt visualized on US Doppler s/p  intervention with IR, closure on . On Ursodiol. Started lactulose on  for high ammonia, dose increased on .   Coags  normal for age.    Hepatology recs appreciated; consulted for concern for liver failure in setting of coagulopathy, thrombocytopenia, direct hyperbilirubinemia. AST/ALT trending up, Dbili/GGT trending down - continue to monitor weekly. UA showed crystals S/p Vitamin K once daily for 3 days (3/17-).  Etiology per Peds GI/hepatology - likely ischemic event vs genetic challenge Labs:  ferritin 1785 (low suspicion for GALD) alpha fetoprotein 01320 (wnl for ; discussed with hepatology) fibrinogen 118, 127 D-dimer 3032 Triglycerides 124 (wnl) Protein C and Protein S activity ordered 3-26, compare to [facility] at [facility] Complete abdominal US with doppler 3/17 showed unremarkable hepatic vasculature, gallbladder and common bile duct.   FEN: Feeding FEHM/Nestle Extensive HA/Neosure 22kcal: ad bobbi, taking 40-70 mL q3hrs. On soybean oil 1mL q6h with feeds for essential fatty acid deficiency on 3/28. Enteral carnitine supplementation started 3/28.   Nutritional insufficiencies, essential fatty acid deficiency and carnitine deficiency on enteral supplements as of 3/28. Occasional emesis.  s/p D10 TPN: TF 25. Recent Lytes 3/28 - acceptable and stable. Essential FA's returned abnormal on 3-25 see nutritionist note. Added ursodiol on 3-26 for direct hyperbili   Heme: Monitor for anemia and thrombocytopenia.  Hct 32%. 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 Will repeat G6PD Level consulted in setting of cerebral parenchymal hemorrhage for diagnostic recommendation for coagulopathy and treatment if applicable.  Coagulopathy c/w consumption, not an inherited disorder is current working dx. Given brain bleed, would transfuse platelets PRN for goal > 50k.    G6PD pending. PRBC transfusion for Hct 25 on .   PRBC transfusion for Hct 22.4 (3/31).  Plt transfusion: 3/17  Coagulopathy workup:   Rpt. Coag WNL ( PT 16.4/PTT46.9 )INR 1.38. Low concern for rapidly progressive coagulopathy.  [x] CBC/diff, reticulocyte count [x] PT, aPTT, fibrinogen, thrombin time, mixing studies, vWD panel (VW antigen, VW factor activity, FVIII assay, blood type) sent 3/18. PT prolonged/corrects on mixing studies. PTT not significantly prolonged per Heme; [X] 3/20 sent obtain factors II, V, VII, X - all low per lab references however deemed acceptable for age per discussion with Hematology [place] [facility] Course:  ID: Monitor for signs and symptoms of sepsis. IT 0.2 on 3/28, incidental finding, no signs or symptoms of sepsis.  Low threshold for a rule out sepsis.  Current:  No tx. Previous tx's: Empiric broad spectrum antibiotics with symptoms focalized to the abdomen however not ruled in for NEC at [facility]   Ampicillin, ceftazidime to end 3/18 after 10-day empiric course started at [facility]; blood culture NGTD Flagyl to end 3/20 after 7-day empiric course started in setting of abdominal distension, Guaiac+ stool cmv pcr, urine, HSV pcr, BCx, UCx,   Neuro: L occipital hemorrhage suggested by echogenicity on head US.  MRI  imaging 3-21 pm. No need for repeat MRI prior to discharge. Outpatient Neurology f/u 1 mo from discharge; NSGY f/u 2 weeks from discharge.  MRI brain w/wo contrast, MRA head and neck w contrast - see report - significant old bleeding and white matter injury evident Neurology recs appreciated. Consult Neurosurgery.- see notes. No advancing hemorrhage, no hydrocephalus Serial HUS's, last  with improved left occipital hemorrhage   Audiology: Failed in right ear, CMV negative x2.   Genetics/Metabolic: consulted for diagnostic recommendation. 3/19 sent: - THEA on 3-19 Results pending - biochem workup including: plasma AA, acylcarnitine panel, urine organic acid, peroxisomal fatty acids    sent 3-18; Results...3-, tyrosine elevated, see report on f/u labs _____________ - Sent carbohydrate deficiency transferrin (3/31) - Sent TALDO - Polyols, Quantitative Urine. ()  Social:   Family updated . Family updated using Creole  (4/3)  Scheduled family meeting for 1600 for multispecialty discussion.[id number] [facility] creole Dr Álvarez, Prabha *Peds Neuro; Dr CALLEJAS---Palliative care, ; Dr. Fernando Agarwal Neonatology, [facility], Start 1645, end 1756 hrs.  Labs/Imaging/Studies:  essential fatty acid panel, LFTs, GGT, total and direct bili. Repeat Brain MRI week of . Follow up essential fatty acid panel.   Plan: Cleared for discharge home by Hepatology, Neurology, Neurosurgery and Hematology as of  with close outpatient follow-up. Meds to be sent and parents need teaching. Possible d/c home  pending family receipt of medication and med teaching.   CCHD Screen []: Initial Pre-Ductal SpO2(%): 98 Post-Ductal SpO2(%): 97 SpO2 Difference(Pre MINUS Post): 1 Extremities Used: Right Hand, Left Foot Result: Passed Follow up: Normal Screen- (No follow-up needed) Screen#: [id number] Screen Date: 12-Mar-2025 Screen Comment: repeated 3/13/25; #[id number] Car seat test passed: yes Car seat test date: 2025 Car seat test comments: passed - no desaturations or bradycardia. Safety 1st car seat   Right ear hearing screen completed date: 2025 Right ear screen method: ABR (auditory brainstem response) Right ear screen result: Failed Right ear screen comment: Audiology to schedule outpatient followup  Left ear hearing screen completed date: 2025 Left ear screen method: ABR (auditory brainstem response) Left ear screen result: Passed Left ear screen comments: N/A    Theodore is a 2 month old ex-36.6 week male with extensive NICU admission presenting for his first [facility]. Today parents report that they are most concerned with his umbilical hernia that seems to be worsening. They report that they went to the emergency department on  and was sent home and told to follow up with the pediatrician. In addition, they are concerned that Theodore is very small and he is always hungry.   For feeds, he takes 180 mL every 3 hours. They report that he often wants more but they are hesitant to feed him more as when he was in the NICU he took a lot less. They also endorse being concerned that they will run out of the formula very soon. They are mixing the formula as taught in the NICU (250 mL water with 10 scoops of nestle Extensive HA formula 24 kcal with soybean oil in every other feed).   Mom reports that she is concerned because Theodore does not sleep much at all and in turn her and father of child do not sleep much either. They say the longest stretch he has slept since being home from the NICU is 1 hour, typically from 4-5 am. Otherwise he is very fussy and cries often. They feel that this pain is from the hernia because when he cries it enlarges.   Mom endorses post partum depression in addition to lack of sleep. She reports that she cries very often but has no intention of harming herself or Theodore. She reports feeling a significant amount of guilt every time Theodore cries.   They report that he has 2-3 green bowel movements a day and passes foul smelling gas. He has 8-10 wet diapers per day. They endorse that he sleeping on his back in a crib but report that he uses a pillow in the crib.

## 2025-05-21 NOTE — DISCUSSION/SUMMARY
[GA at Birth: ___] : GA at Birth: [unfilled] [Chronological Age: ___] : Chronological Age: [unfilled] [Alert] : alert [Irritable] : irritable [] : axial tone normal [Turns head to both sides (0-2 months)] : turns head to both sides (0-2 months) [Moves extremities equally] : moves extremities equally [Moves against gravity] : moves against gravity [Hands to midline (0-3 months)] : hands to midline (0-3 months) [Turns head side to side] : turns head side to side [Lifts head (45 deg 0-2 mon, 90 deg 1-3 mon)] : lifts head (45 degrees 0-2 months, 90 degrees 1-3 months) [Props on elbows (2-4 months)] : props on elbows (2-4 months) [Active] : sidelying to supine (1.5 - 2 months) - Active [Passive] : prone to supine (2- 5 months) - Passive [Lag] : Head lag (0-2 months) - lag [Poor] : head control is poor [Gross Grasp] : gross grasp [Release] : release [<] : < [Focusing (2 months)] : focusing (2 months) [Supine] : supine [Prone] : prone [Sidelying] : sidelying [FreeTextEntry1] : respiratory failure, pulmonary HTN, intubation, mech ventilation, HFOV [FreeTextEntry6] : mildly increased tone bilateral UE [FreeTextEntry3] :  #[id number] Adelina Zabala present for translation during session. Infant tolerated PT session well. Parents educated in Bilateral UE ROM activities & developmental positioning packet level I with good understanding.

## 2025-05-21 NOTE — DISCUSSION/SUMMARY
[GA at Birth: ___] : GA at Birth: [unfilled] [Chronological Age: ___] : Chronological Age: [unfilled] [Alert] : alert [Irritable] : irritable [] : axial tone normal [Turns head to both sides (0-2 months)] : turns head to both sides (0-2 months) [Moves extremities equally] : moves extremities equally [Moves against gravity] : moves against gravity [Hands to midline (0-3 months)] : hands to midline (0-3 months) [Turns head side to side] : turns head side to side [Lifts head (45 deg 0-2 mon, 90 deg 1-3 mon)] : lifts head (45 degrees 0-2 months, 90 degrees 1-3 months) [Props on elbows (2-4 months)] : props on elbows (2-4 months) [Active] : sidelying to supine (1.5 - 2 months) - Active [Passive] : prone to supine (2- 5 months) - Passive [Lag] : Head lag (0-2 months) - lag [Poor] : head control is poor [Gross Grasp] : gross grasp [Release] : release [<] : < [Focusing (2 months)] : focusing (2 months) [Supine] : supine [Prone] : prone [Sidelying] : sidelying [FreeTextEntry1] : respiratory failure, pulmonary HTN, intubation, mech ventilation, HFOV [FreeTextEntry6] : mildly increased tone bilateral UE [FreeTextEntry3] :  #[id number] Adelina Zabala present for translation during session. Infant tolerated PT session well. Parents educated in Bilateral UE ROM activities & developmental positioning packet level I with good understanding.

## 2025-05-21 NOTE — DISCUSSION/SUMMARY
[GA at Birth: ___] : GA at Birth: [unfilled] [Chronological Age: ___] : Chronological Age: [unfilled] [Alert] : alert [Irritable] : irritable [] : axial tone normal [Turns head to both sides (0-2 months)] : turns head to both sides (0-2 months) [Moves extremities equally] : moves extremities equally [Moves against gravity] : moves against gravity [Hands to midline (0-3 months)] : hands to midline (0-3 months) [Turns head side to side] : turns head side to side [Lifts head (45 deg 0-2 mon, 90 deg 1-3 mon)] : lifts head (45 degrees 0-2 months, 90 degrees 1-3 months) [Props on elbows (2-4 months)] : props on elbows (2-4 months) [Active] : sidelying to supine (1.5 - 2 months) - Active [Passive] : prone to supine (2- 5 months) - Passive [Lag] : Head lag (0-2 months) - lag [Poor] : head control is poor [Gross Grasp] : gross grasp [Release] : release [<] : < [Focusing (2 months)] : focusing (2 months) [Supine] : supine [Prone] : prone [Sidelying] : sidelying [FreeTextEntry1] : respiratory failure, pulmonary HTN, intubation, mech ventilation, HFOV [FreeTextEntry6] : mildly increased tone bilateral UE [FreeTextEntry3] :  #[id number] Adelina Zabala present for translation during session. Infant tolerated PT session well. Parents educated in Bilateral UE ROM activities & developmental positioning packet level I with good understanding.

## 2025-05-21 NOTE — DISCUSSION/SUMMARY
[GA at Birth: ___] : GA at Birth: [unfilled] [Chronological Age: ___] : Chronological Age: [unfilled] [Alert] : alert [Irritable] : irritable [] : axial tone normal [Turns head to both sides (0-2 months)] : turns head to both sides (0-2 months) [Moves extremities equally] : moves extremities equally [Moves against gravity] : moves against gravity [Hands to midline (0-3 months)] : hands to midline (0-3 months) [Turns head side to side] : turns head side to side [Lifts head (45 deg 0-2 mon, 90 deg 1-3 mon)] : lifts head (45 degrees 0-2 months, 90 degrees 1-3 months) [Props on elbows (2-4 months)] : props on elbows (2-4 months) [Active] : sidelying to supine (1.5 - 2 months) - Active [Passive] : prone to supine (2- 5 months) - Passive [Lag] : Head lag (0-2 months) - lag [Poor] : head control is poor [Gross Grasp] : gross grasp [Release] : release [<] : < [Focusing (2 months)] : focusing (2 months) [Supine] : supine [Prone] : prone [Sidelying] : sidelying [FreeTextEntry1] : respiratory failure, pulmonary HTN, intubation, mech ventilation, HFOV [FreeTextEntry6] : mildly increased tone bilateral UE [FreeTextEntry3] :  #[id number] Adelina Zabala present for translation during session. Infant tolerated PT session well. Parents educated in Bilateral UE ROM activities & developmental positioning packet level I with good understanding.

## 2025-05-22 NOTE — ASSESSMENT
[FreeTextEntry1] : ANDIE GRANT  is a __36.6 _week gestation infant, now chronologic age __2 mo____ seen in  follow-up for hx of Respiratory failure, nieves-systemic shunt with liver disease, nutritional deficiencies.. Pertinent NICU history includes respiratory failure, pulmonary hypertension, carnitive deficiency, cerebral hemorrhage, nieves-systemic shunt s/p embolization, liver disease, failed hearing test G6PD deficiency on WGS.  The following issues were addressed at this visit.  Growth and nutrition: Weight gain has been  21 grams/day and plots at the 1st percentile for corrected age.  Head growth and length are at the 1st and 1st percentiles respectively.  Baby is currently feeding Nestle Extensive HA formula 24kcal and the plan is to continue since baby is growing and tolerating formula. Continue soybean oil as well for essential fatty acid deficiency noted in the NICU. Infant to follow up with hepatology on . Solid foods are not recommended until 5-6 months corrected age with good head control. No labs to be obtained today. Continue vitamin supplements.  Development/neuro: baby has developmental delay for chronologic age, was seen by PT today and given home exercises to do. Baby also has increased upper extremity tone and head lag, irritability and  abnormal movement pattern for age with history of intracranial hemorrhage. Early Intervention is recommended, will make referral from NICU clinic.  Baby will follow-up with pediatric developmental in 2025 - appointment needed. Andie also needs follow up with pediatric neurology and neurosurgery.  Anemia: Hct reviewed and is appropriate for age. Patient had genetic testing positive for X-linked G6PD deficiency, although G6PD serum levels were normal in hospital. Patient to follow up with hematology (needs appointment).   AMMON: Baby has signs of AMMON. Reviewed non-pharmacologic methods to reduce symptoms including keeping upright for 30 mins after feeds.  ENT: Failed hearing screen. Seeing ENT on  - father reports this is for ear cleaning prior to repeat hearing test on .   Inguinal hernia/umbilical hernia observed and easily reducible.  Reviewed signs of incarceration with parent. Consider delaying inguinal hernia repair beyond 52 weeks corrected age  and  baby to be followed by peds surgery.     GI/metab: s/p embolization of nieves-systemic shunt- Baby followed by hepatology, remains on Ursodiol, and direct bili slowly improving. LFTS still elevated.  Also on carnitine supplement for carnitine deficiency. WGS and mitochondrial DNA did not reveal an etiology for the deficiency.  Other:   Health maintenance: Reviewed routine vaccination schedule with parent as well as guidance for flu vaccine for family, COVID-19 precautions, and need for PMD f/u.  Also discussed bathing and skin care recommendations.  Social: Pediatrician office has referred patient to [facility] and father states they have been assigned a  (Barb) to help family with making appointments and obtaining additional resources. We will reach out to inform PMD office or  of the additional appointments Andie needs which include neurology, neurosurgery, and hematology.   Reviewed notes by pediatrician, surgery, audiology and inpatient charts.   Next neonatology f/u:  10:45am.

## 2025-05-22 NOTE — PHYSICAL EXAM
[Pink] : pink [Well Perfused] : well perfused [No Birth Marks] : no birth marks [Conjunctiva Clear] : conjunctiva clear [PERRL] : pupils were equal, round, reactive to light  [Ears Normal Position and Shape] : normal position and shape of ears [Nares Patent] : nares patent [No Nasal Flaring] : no nasal flaring [Moist and Pink Mucous Membranes] : moist and pink mucous membranes [Palate Intact] : palate intact [No Torticollis] : no torticollis [No Neck Masses] : no neck masses [Symmetric Expansion] : symmetric chest expansion [No Retractions] : no retractions [Clear to Auscultation] : lungs clear to auscultation  [Normal S1, S2] : normal S1 and S2 [Regular Rhythm] : regular rhythm [No Murmur] : no mumur [Normal Pulses] : normal pulses [Non Distended] : non distended [No HSM] : no hepatosplenomegaly appreciated [No Masses] : no masses were palpated [Normal Bowel Sounds] : normal bowel sounds [Normal Genitalia] : normal genitalia [No Sacral Dimples] : no sacral dimples [No Scoliosis] : no scoliosis [Normal Range of Motion] : normal range of motion [Normal Posture] : normal posture [No evidence of Hip Dislocation] : no evidence of hip dislocation [Active and Alert] : active and alert [Normal truncal tone] : normal truncal tone [Normal deep tendon reflexes] : normal deep tendon reflexes [Symmetric Zeny] : the [place] reflex was ~L present [Palmar Grasp] : the palmar grasp reflex was ~L present [Strong Suck] : the strong sucking reflex was ~L present [Fixes On Faces] : fixes on faces [[place]] : coos [Lifts Head And Chest 30 degress in Prone] : lifts the head and chest 30 degress in prone [Brings Hands to Midline] : brings hands to midline [Plantar Grasp] : the plantar grasp reflex was ~L present [Rooting] : the rooting reflex was ~L present [Hands Open] : the hands are not open [Brings Hands to Mouth] : does not bring hands to mouth [de-identified] : +jaundice [FreeTextEntry2] : +scleral icterus [de-identified] : +reducible large umbilical hernia [de-identified] : bilateral reducible inguinal hernias [de-identified] : +increased tone of upper extremities, Clenched fists. +mild head lag irritable and moves constantly, arches

## 2025-05-22 NOTE — HISTORY OF PRESENT ILLNESS
[Gestational Age: ___] : Gestational Age: [unfilled] [Chronological Age: ___] : Chronological Age: [unfilled] [Date of D/C: ___] : Date of D/C: [unfilled] [Pediatric Surgery: ___] : Pediatric Surgery: [unfilled] [Gastroenterology: ___] : Gastroenterology: [unfilled] [Developmental Pediatrics: ___] : Developmental Pediatrics: [unfilled] [Car seat use according to directions] : car seat used according to directions [___Formula] : [unfilled] [___ ounces/feeding] : ~MAITE luciano/feeding [Every ___ hours] : every [unfilled] hours [_____ Times Per] : Stool frequency occurs [unfilled] times per  [Day] : day [Soft] : soft [Solid Foods] : no solid food at this time [Weight Gain Since Last Visit (oz/days) ___] : weight gain since last visit: [unfilled] (oz/days)  [Variable amount] : variable  [Bloody] : not bloody [Mucousy] : no mucous [de-identified] : Readmitted for diarrhea and vomiting - decreased frequency of MCT oil in formula bottles wiht improvement  Saw PMD once and will follow up in June 17th and July 1st. May 8th GI appointment cancelled since patient was readmitted. Hepatology appointment scheduled for 6/17.  No hematology, neuro, or neurosurgery appointments made. Parents have  thr pediatrician's office to help with appointments. They did not know that neurology or neurosurgery follow-up is needed.  ENT appointment 6/17 - repeat hearing test limited by patient activity, follow up 7/1 for repeat exam. L ear needs cleaning (failed screen) prior to repeat hearing screen. Gastroenterology -  Surgery - Theodore is too small for hernia repair at this time, will defer until later.   Parents are still concerned about vomiting after feeds. Burping usually with vomiting with phelgm and some digested milk. 2x/day. However he is gaining weight and eats a lot.  Parents concerned about sleep - he takes a nap 45-60min 2x/day. At night, will sleep 1.5-2h stretch then feeds, diaper change, and sleep a few more hours before repeating.   [de-identified] : not yet getting tummy time fixes on face  FOB notes baby moves a lot,  [de-identified] : Admitted x3 days at [facility] [place] for failure to thrive and diarrhea. [de-identified] : Neurology, Neurosurgery, ENT - 6/17, Genetics? Hearing  [de-identified] : 500ml/day plus overnight feeds [de-identified] : on back to sleep in own crib

## 2025-05-22 NOTE — REASON FOR VISIT
[Initial - Scheduled] : an initial, scheduled visit with concerns of [Inguinal Hernia] : inguinal hernia [Umbilical hernia] : umbilical hernia  [Parents] : parents [Patient] : patient [Language Line ] : provided by Language Line   [TWNoteComboBox1] : Vj

## 2025-05-22 NOTE — REASON FOR VISIT
[F/U - Hospitalization] : follow-up of a recent hospitalization for [Developmental Delay] : developmental delay [Parents] : parents [Language Line ] : provided by Language Line   [Time Spent: ____ minutes] : Total time spent using  services: [unfilled] minutes. The patient's primary language is not English thus required  services. [Interpreters_IDNumber] : [id number] [Interpreters_FullName] : Adelina [TWNoteComboBox1] : Vj

## 2025-05-22 NOTE — PHYSICAL EXAM
[Pink] : pink [Well Perfused] : well perfused [No Birth Marks] : no birth marks [Conjunctiva Clear] : conjunctiva clear [PERRL] : pupils were equal, round, reactive to light  [Ears Normal Position and Shape] : normal position and shape of ears [Nares Patent] : nares patent [No Nasal Flaring] : no nasal flaring [Moist and Pink Mucous Membranes] : moist and pink mucous membranes [Palate Intact] : palate intact [No Torticollis] : no torticollis [No Neck Masses] : no neck masses [Symmetric Expansion] : symmetric chest expansion [No Retractions] : no retractions [Clear to Auscultation] : lungs clear to auscultation  [Normal S1, S2] : normal S1 and S2 [Regular Rhythm] : regular rhythm [No Murmur] : no mumur [Normal Pulses] : normal pulses [Non Distended] : non distended [No HSM] : no hepatosplenomegaly appreciated [No Masses] : no masses were palpated [Normal Bowel Sounds] : normal bowel sounds [Normal Genitalia] : normal genitalia [No Sacral Dimples] : no sacral dimples [No Scoliosis] : no scoliosis [Normal Range of Motion] : normal range of motion [Normal Posture] : normal posture [No evidence of Hip Dislocation] : no evidence of hip dislocation [Active and Alert] : active and alert [Normal truncal tone] : normal truncal tone [Normal deep tendon reflexes] : normal deep tendon reflexes [Symmetric Zeny] : the [place] reflex was ~L present [Palmar Grasp] : the palmar grasp reflex was ~L present [Strong Suck] : the strong sucking reflex was ~L present [Fixes On Faces] : fixes on faces [[place]] : coos [Lifts Head And Chest 30 degress in Prone] : lifts the head and chest 30 degress in prone [Brings Hands to Midline] : brings hands to midline [Plantar Grasp] : the plantar grasp reflex was ~L present [Rooting] : the rooting reflex was ~L present [Hands Open] : the hands are not open [Brings Hands to Mouth] : does not bring hands to mouth [de-identified] : +jaundice [FreeTextEntry2] : +scleral icterus [de-identified] : +reducible large umbilical hernia [de-identified] : bilateral reducible inguinal hernias [de-identified] : +increased tone of upper extremities, Clenched fists. +mild head lag irritable and moves constantly, arches

## 2025-05-22 NOTE — BIRTH HISTORY
[Birthweight ___ kg] : weight [unfilled] kg [Weight ___ kg] : weight [unfilled] kg [Length ___ cm] : length [unfilled] cm [Head Circumference ___ cm] : head circumference [unfilled] cm [EHM: ___] : EHM: [unfilled] [Formula: ____] : formula: [unfilled] [de-identified] : Infant born at 36.6 wk to 33 yo  - B+, GBS neg, HIV neg, RPR neg, rub imm, GC/Cl neg.  Pregnancy complicated by decreased fetal growth and decreased fetal movement immediately prior to delivery.  At delivery infant limp and apneic - received PPV and then admitted to [facility] NICU on CPAP 5 40%.  Infant escalated and required intubation, mech ventilation and then HFOV and Rosie.  Infant required vasopressor support  HUS intraparanchymal hemorrhage.  Infant transferred to [facility] N[facility] on DOL 8 for escalation of care. 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

## 2025-05-22 NOTE — BIRTH HISTORY
[Birthweight ___ kg] : weight [unfilled] kg [Weight ___ kg] : weight [unfilled] kg [Length ___ cm] : length [unfilled] cm [Head Circumference ___ cm] : head circumference [unfilled] cm [EHM: ___] : EHM: [unfilled] [Formula: ____] : formula: [unfilled] [de-identified] : Infant born at 36.6 wk to 33 yo  - B+, GBS neg, HIV neg, RPR neg, rub imm, GC/Cl neg.  Pregnancy complicated by decreased fetal growth and decreased fetal movement immediately prior to delivery.  At delivery infant limp and apneic - received PPV and then admitted to [facility] NICU on CPAP 5 40%.  Infant escalated and required intubation, mech ventilation and then HFOV and Rosie.  Infant required vasopressor support  HUS intraparanchymal hemorrhage.  Infant transferred to [facility] NICU on DOL 8 for escalation of care. 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

## 2025-05-22 NOTE — PHYSICAL EXAM
[Inguinal hernia] : inguinal hernia [No incision] : no incision [Soft] : soft [Testicle descended on left] : testicle descended on left [Testicle descended on right] : testicle descended on right [NL] : grossly intact [Acute Distress] : no acute distress [Pectus excavatum] : no pectus excavatum [Pectus carinatum] : no pectus carinatum [Tender] : not tender [Distended] : not distended [Rash] : no rash [TextBox_37] : 1.4 cm fascial defect umbilicus with large amount redundant skin [TextBox_67] : bilateral inguinal hernias

## 2025-05-22 NOTE — REASON FOR VISIT
[F/U - Hospitalization] : follow-up of a recent hospitalization for [Developmental Delay] : developmental delay [Parents] : parents [Language Line ] : provided by Language Line   [Time Spent: ____ minutes] : Total time spent using  services: [unfilled] minutes. The patient's primary language is not English thus required  services. [Interpreters_IDNumber] : [id number] [Interpreters_FullName] : Adelina [TWNoteComboBox1] : Vj

## 2025-05-22 NOTE — HISTORY OF PRESENT ILLNESS
[Gestational Age: ___] : Gestational Age: [unfilled] [Chronological Age: ___] : Chronological Age: [unfilled] [Date of D/C: ___] : Date of D/C: [unfilled] [Pediatric Surgery: ___] : Pediatric Surgery: [unfilled] [Gastroenterology: ___] : Gastroenterology: [unfilled] [Developmental Pediatrics: ___] : Developmental Pediatrics: [unfilled] [Car seat use according to directions] : car seat used according to directions [___Formula] : [unfilled] [___ ounces/feeding] : ~MAITE luciano/feeding [Every ___ hours] : every [unfilled] hours [_____ Times Per] : Stool frequency occurs [unfilled] times per  [Day] : day [Soft] : soft [Solid Foods] : no solid food at this time [Weight Gain Since Last Visit (oz/days) ___] : weight gain since last visit: [unfilled] (oz/days)  [Variable amount] : variable  [Bloody] : not bloody [Mucousy] : no mucous [de-identified] : Readmitted for diarrhea and vomiting - decreased frequency of MCT oil in formula bottles wiht improvement  Saw PMD once and will follow up in June 17th and July 1st. May 8th GI appointment cancelled since patient was readmitted. Hepatology appointment scheduled for 6/17.  No hematology, neuro, or neurosurgery appointments made. Parents have  thr pediatrician's office to help with appointments. They did not know that neurology or neurosurgery follow-up is needed.  ENT appointment 6/17 - repeat hearing test limited by patient activity, follow up 7/1 for repeat exam. L ear needs cleaning (failed screen) prior to repeat hearing screen. Gastroenterology -  Surgery - Theodore is too small for hernia repair at this time, will defer until later.   Parents are still concerned about vomiting after feeds. Burping usually with vomiting with phelgm and some digested milk. 2x/day. However he is gaining weight and eats a lot.  Parents concerned about sleep - he takes a nap 45-60min 2x/day. At night, will sleep 1.5-2h stretch then feeds, diaper change, and sleep a few more hours before repeating.   [de-identified] : not yet getting tummy time fixes on face  FOB notes baby moves a lot,  [de-identified] : Admitted x3 days at [facility] [place] for failure to thrive and diarrhea. [de-identified] : Neurology, Neurosurgery, ENT - 6/17, Genetics? Hearing  [de-identified] : 500ml/day plus overnight feeds [de-identified] : on back to sleep in own crib

## 2025-05-22 NOTE — HISTORY OF PRESENT ILLNESS
[Gestational Age: ___] : Gestational Age: [unfilled] [Chronological Age: ___] : Chronological Age: [unfilled] [Date of D/C: ___] : Date of D/C: [unfilled] [Pediatric Surgery: ___] : Pediatric Surgery: [unfilled] [Gastroenterology: ___] : Gastroenterology: [unfilled] [Developmental Pediatrics: ___] : Developmental Pediatrics: [unfilled] [Car seat use according to directions] : car seat used according to directions [___Formula] : [unfilled] [___ ounces/feeding] : ~MAITE luciano/feeding [Every ___ hours] : every [unfilled] hours [_____ Times Per] : Stool frequency occurs [unfilled] times per  [Day] : day [Soft] : soft [Solid Foods] : no solid food at this time [Weight Gain Since Last Visit (oz/days) ___] : weight gain since last visit: [unfilled] (oz/days)  [Variable amount] : variable  [Bloody] : not bloody [Mucousy] : no mucous [de-identified] : Readmitted for diarrhea and vomiting - decreased frequency of MCT oil in formula bottles wiht improvement  Saw PMD once and will follow up in June 17th and July 1st. May 8th GI appointment cancelled since patient was readmitted. Hepatology appointment scheduled for 6/17.  No hematology, neuro, or neurosurgery appointments made. Parents have  thr pediatrician's office to help with appointments. They did not know that neurology or neurosurgery follow-up is needed.  ENT appointment 6/17 - repeat hearing test limited by patient activity, follow up 7/1 for repeat exam. L ear needs cleaning (failed screen) prior to repeat hearing screen. Gastroenterology -  Surgery - Theodore is too small for hernia repair at this time, will defer until later.   Parents are still concerned about vomiting after feeds. Burping usually with vomiting with phelgm and some digested milk. 2x/day. However he is gaining weight and eats a lot.  Parents concerned about sleep - he takes a nap 45-60min 2x/day. At night, will sleep 1.5-2h stretch then feeds, diaper change, and sleep a few more hours before repeating.   [de-identified] : not yet getting tummy time fixes on face  FOB notes baby moves a lot,  [de-identified] : Ad[place]3 [place] [facility] [place] for failure to thrive and diarrhea. [de-identified] : Neurology, Neurosurgery, ENT - 6/17, Genetics? Hearing  [de-identified] : 500ml/day plus overnight feeds [de-identified] : on back to sleep in own crib

## 2025-05-22 NOTE — CONSULT LETTER
[Dear  ___] : Dear  [unfilled], [Courtesy Letter:] : I had the pleasure of seeing your patient, [unfilled], in my office today. [Please see my note below.] : Please see my note below. [Consult Closing:] : Thank you very much for allowing me to participate in the care of this patient.  If you have any questions, please do not hesitate to contact me. [Sincerely,] : Sincerely, [FreeTextEntry2] : Florida Harrington MD [FreeTextEntry3] : Atif Nunes MD Associate Professor of Surgery and Pediatrics [facility] [employer] at [facility] Pediatric Surgery [facility] [phone]

## 2025-05-22 NOTE — ASSESSMENT
[FreeTextEntry1] : Theodore is a 2mo ex 36wk male presenting today for evaluation of an umbilical hernia and bilateral inguinal hernia. On exam, all three hernias were clearly observed and were easily reducible. I counselled the parents on these diagnoses and offered my reassurance. I explained the risks and benefits of the operation for repair including infection, bleeding and damage to nearby structures. However, given the child's complex medical history, it is my recommendation that we defer surgical intervention until he is cleared by his medical team and a little older and larger. I made sure the parents understood that the hernias are not at all urgent.  I discussed issue of incarceration and explained that to them. Parents of child verbalized their understanding and are in agreement with the plan. We will follow up with the family for further surgical planning. They have my contact information and know to reach out with any further questions or concerns. I will touch base with primary peds team.

## 2025-05-22 NOTE — HISTORY OF PRESENT ILLNESS
[Gestational Age: ___] : Gestational Age: [unfilled] [Chronological Age: ___] : Chronological Age: [unfilled] [Date of D/C: ___] : Date of D/C: [unfilled] [Pediatric Surgery: ___] : Pediatric Surgery: [unfilled] [Gastroenterology: ___] : Gastroenterology: [unfilled] [Developmental Pediatrics: ___] : Developmental Pediatrics: [unfilled] [Car seat use according to directions] : car seat used according to directions [___Formula] : [unfilled] [___ ounces/feeding] : ~MAITE luciano/feeding [Every ___ hours] : every [unfilled] hours [_____ Times Per] : Stool frequency occurs [unfilled] times per  [Day] : day [Soft] : soft [Solid Foods] : no solid food at this time [Weight Gain Since Last Visit (oz/days) ___] : weight gain since last visit: [unfilled] (oz/days)  [Variable amount] : variable  [Bloody] : not bloody [Mucousy] : no mucous [de-identified] : Readmitted for diarrhea and vomiting - decreased frequency of MCT oil in formula bottles wiht improvement  Saw PMD once and will follow up in June 17th and July 1st. May 8th GI appointment cancelled since patient was readmitted. Hepatology appointment scheduled for 6/17.  No hematology, neuro, or neurosurgery appointments made. Parents have  thr pediatrician's office to help with appointments. They did not know that neurology or neurosurgery follow-up is needed.  ENT appointment 6/17 - repeat hearing test limited by patient activity, follow up 7/1 for repeat exam. L ear needs cleaning (failed screen) prior to repeat hearing screen. Gastroenterology -  Surgery - Theodore is too small for hernia repair at this time, will defer until later.   Parents are still concerned about vomiting after feeds. Burping usually with vomiting with phelgm and some digested milk. 2x/day. However he is gaining weight and eats a lot.  Parents concerned about sleep - he takes a nap 45-60min 2x/day. At night, will sleep 1.5-2h stretch then feeds, diaper change, and sleep a few more hours before repeating.   [de-identified] : not yet getting tummy time fixes on face  FOB notes baby moves a lot,  [de-identified] : Admitted x3 days at [facility] for failure to thrive and diarrhea. [de-identified] : Neurology, Neurosurgery, ENT - 6/17, Genetics? Hearing  [de-identified] : 500ml/day plus overnight feeds [de-identified] : on back to sleep in own crib

## 2025-05-22 NOTE — REASON FOR VISIT
[F/U - Hospitalization] : follow-up of a recent hospitalization for [Developmental Delay] : developmental delay [Parents] : parents [Language Line ] : provided by Language Line   [Time Spent: ____ minutes] : Total time spent using  services: [unfilled] minutes. The patient's primary language is not English thus required  services. [Interpreters_IDNumber] : [id number] [Interpreters_FullName] : Adelina [TWNoteComboBox1] : Vj

## 2025-05-22 NOTE — PATIENT INSTRUCTIONS
[FreeTextEntry1] : Next  follow up appointment  at 10:45AM Please schedule appointments with neurology, neurosurgery, and hematology (Office contact numbers provided). We will contact your pediatrician's office to help coordinate these appointments. Theodore has appointments scheduled for the ear doctor (ENT Dr. Birmingham), repeat hearing test, and Dr. Raymundo (Liver doctor). [FreeTextEntry2] : Patient seen in office today and exercise instructions provided [FreeTextEntry3] : Early intervention is recommended. We will call to make the referral. The early intervention office will contact you to schedule an initial evaluation. If you are sent paperwork, please fill it out and submit it. [FreeTextEntry4] : Continue formula and soybean oil as directed. Continue multivitamins [FreeTextEntry5] : Continue ursodiol, levocarnitine,  [FreeTextEntry6] : N/A [FreeTextEntry7] : N/A [FreeTextEntry8] : Routine immunizations needed at pediatrician's office [de-identified] : May use fragrance-free moisturizer (recommend Aquaphor) for dry skin [de-identified] : None needed at this time. [de-identified] : None needed at this time.

## 2025-05-22 NOTE — CONSULT LETTER
[Dear  ___] : Dear  [unfilled], [Courtesy Letter:] : I had the pleasure of seeing your patient, [unfilled], in my office today. [Please see my note below.] : Please see my note below. [Consult Closing:] : Thank you very much for allowing me to participate in the care of this patient.  If you have any questions, please do not hesitate to contact me. [Sincerely,] : Sincerely, [FreeTextEntry2] : Florida Harrington MD [FreeTextEntry3] : Atif Nunes MD Associate Professor of Surgery and Pediatrics [facility] [employer] at [facility] Pediatric Surgery [facility] [phone]

## 2025-05-22 NOTE — HISTORY OF PRESENT ILLNESS
[FreeTextEntry1] : Theodore is a complicated 2mo ex 36 week male presenting today for surgical evaluation of an umbilical hernia and bilateral inguinal hernias. He has a complex past medical history including a long NICU stay with intubation, a parenchymal hemorrhage, portosystemic shunt, and several deficiencies. He is followed by hepatology as well as neurology and neurosurgery. Parents of child report intermittent bulging at all 3 hernia sites without signs of incarceration.

## 2025-05-22 NOTE — REASON FOR VISIT
[F/U - Hospitalization] : follow-up of a recent hospitalization for [Developmental Delay] : developmental delay [Parents] : parents [Language Line ] : provided by Language Line   [Time Spent: ____ minutes] : Total time spent using  services: [unfilled] minutes. The patient's primary language is not English thus required  services. [Interpreters_IDNumber] : [id number] [Interpreters_FullName] : Adelina [TWNoteComboBox1] : Vj

## 2025-05-22 NOTE — BIRTH HISTORY
[Birthweight ___ kg] : weight [unfilled] kg [Weight ___ kg] : weight [unfilled] kg [Length ___ cm] : length [unfilled] cm [Head Circumference ___ cm] : head circumference [unfilled] cm [EHM: ___] : EHM: [unfilled] [Formula: ____] : formula: [unfilled] [de-identified] : Infant born at 36.6 wk to 31 yo  - B+, GBS neg, HIV neg, RPR neg, rub imm, GC/Cl neg.  Pregnancy complicated by decreased fetal growth and decreased fetal movement immediately prior to delivery.  At delivery infant limp and apneic - received PPV and then admitted to OSH NICU on CPAP 5 40%.  Infant escalated and required intubation, mech ventilation and then HFOV and Rosie.  Infant required vasopressor support  HUS intraparanchymal hemorrhage.  Infant transferred to [facility] on DOL 8 for escalation of care. 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

## 2025-05-22 NOTE — BIRTH HISTORY
[Birthweight ___ kg] : weight [unfilled] kg [Weight ___ kg] : weight [unfilled] kg [Length ___ cm] : length [unfilled] cm [Head Circumference ___ cm] : head circumference [unfilled] cm [EHM: ___] : EHM: [unfilled] [Formula: ____] : formula: [unfilled] [de-identified] : Infant born at 36.6 wk to 33 yo  - B+, GBS neg, HIV neg, RPR neg, rub imm, GC/Cl neg.  Pregnancy complicated by decreased fetal growth and decreased fetal movement immediately prior to delivery.  At delivery infant limp and apneic - received PPV and then admitted to OSH NICU on CPAP 5 40%.  Infant escalated and required intubation, mech ventilation and then HFOV and Rosie.  Infant required vasopressor support  HUS intraparanchymal hemorrhage.  Infant transferred to [facility] NICU on DOL 8 for escalation of care. 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

## 2025-05-22 NOTE — PHYSICAL EXAM
[Pink] : pink [Well Perfused] : well perfused [No Birth Marks] : no birth marks [Conjunctiva Clear] : conjunctiva clear [PERRL] : pupils were equal, round, reactive to light  [Ears Normal Position and Shape] : normal position and shape of ears [Nares Patent] : nares patent [No Nasal Flaring] : no nasal flaring [Moist and Pink Mucous Membranes] : moist and pink mucous membranes [Palate Intact] : palate intact [No Torticollis] : no torticollis [No Neck Masses] : no neck masses [Symmetric Expansion] : symmetric chest expansion [No Retractions] : no retractions [Clear to Auscultation] : lungs clear to auscultation  [Normal S1, S2] : normal S1 and S2 [Regular Rhythm] : regular rhythm [No Murmur] : no mumur [Normal Pulses] : normal pulses [Non Distended] : non distended [No HSM] : no hepatosplenomegaly appreciated [No Masses] : no masses were palpated [Normal Bowel Sounds] : normal bowel sounds [Normal Genitalia] : normal genitalia [No Sacral Dimples] : no sacral dimples [No Scoliosis] : no scoliosis [Normal Range of Motion] : normal range of motion [Normal Posture] : normal posture [No evidence of Hip Dislocation] : no evidence of hip dislocation [Active and Alert] : active and alert [Normal truncal tone] : normal truncal tone [Normal deep tendon reflexes] : normal deep tendon reflexes [Symmetric Zeny] : the Electra reflex was ~L present [Palmar Grasp] : the palmar grasp reflex was ~L present [Strong Suck] : the strong sucking reflex was ~L present [Fixes On Faces] : fixes on faces [[place]] : coos [Lifts Head And Chest 30 degress in Prone] : lifts the head and chest 30 degress in prone [Brings Hands to Midline] : brings hands to midline [Plantar Grasp] : the plantar grasp reflex was ~L present [Rooting] : the rooting reflex was ~L present [Hands Open] : the hands are not open [Brings Hands to Mouth] : does not bring hands to mouth [de-identified] : +jaundice [FreeTextEntry2] : +scleral icterus [de-identified] : +reducible large umbilical hernia [de-identified] : bilateral reducible inguinal hernias [de-identified] : +increased tone of upper extremities, Clenched fists. +mild head lag irritable and moves constantly, arches

## 2025-05-22 NOTE — PHYSICAL EXAM
[Pink] : pink [Well Perfused] : well perfused [No Birth Marks] : no birth marks [Conjunctiva Clear] : conjunctiva clear [PERRL] : pupils were equal, round, reactive to light  [Ears Normal Position and Shape] : normal position and shape of ears [Nares Patent] : nares patent [No Nasal Flaring] : no nasal flaring [Moist and Pink Mucous Membranes] : moist and pink mucous membranes [Palate Intact] : palate intact [No Torticollis] : no torticollis [No Neck Masses] : no neck masses [Symmetric Expansion] : symmetric chest expansion [No Retractions] : no retractions [Clear to Auscultation] : lungs clear to auscultation  [Normal S1, S2] : normal S1 and S2 [Regular Rhythm] : regular rhythm [No Murmur] : no mumur [Normal Pulses] : normal pulses [Non Distended] : non distended [No HSM] : no hepatosplenomegaly appreciated [No Masses] : no masses were palpated [Normal Bowel Sounds] : normal bowel sounds [Normal Genitalia] : normal genitalia [No Sacral Dimples] : no sacral dimples [No Scoliosis] : no scoliosis [Normal Range of Motion] : normal range of motion [Normal Posture] : normal posture [No evidence of Hip Dislocation] : no evidence of hip dislocation [Active and Alert] : active and alert [Normal truncal tone] : normal truncal tone [Normal deep tendon reflexes] : normal deep tendon reflexes [Symmetric Zeny] : the [place] reflex was ~L present [Palmar Grasp] : the palmar grasp reflex was ~L present [Strong Suck] : the strong sucking reflex was ~L present [Fixes On Faces] : fixes on faces [[place]] : coos [Lifts Head And Chest 30 degress in Prone] : lifts the head and chest 30 degress in prone [Brings Hands to Midline] : brings hands to midline [Plantar Grasp] : the plantar grasp reflex was ~L present [Rooting] : the rooting reflex was ~L present [Hands Open] : the hands are not open [Brings Hands to Mouth] : does not bring hands to mouth [de-identified] : +jaundice [FreeTextEntry2] : +scleral icterus [de-identified] : +reducible large umbilical hernia [de-identified] : bilateral reducible inguinal hernias [de-identified] : +increased tone of upper extremities, Clenched fists. +mild head lag irritable and moves constantly, arches

## 2025-05-22 NOTE — PATIENT INSTRUCTIONS
[FreeTextEntry1] : Next  follow up appointment  at 10:45AM Please schedule appointments with neurology, neurosurgery, and hematology (Office contact numbers provided). We will contact your pediatrician's office to help coordinate these appointments. Theodore has appointments scheduled for the ear doctor (ENT Dr. Birmingham), repeat hearing test, and Dr. Raymundo (Liver doctor). [FreeTextEntry2] : Patient seen in office today and exercise instructions provided [FreeTextEntry3] : Early intervention is recommended. We will call to make the referral. The early intervention office will contact you to schedule an initial evaluation. If you are sent paperwork, please fill it out and submit it. [FreeTextEntry4] : Continue formula and soybean oil as directed. Continue multivitamins [FreeTextEntry5] : Continue ursodiol, levocarnitine,  [FreeTextEntry6] : N/A [FreeTextEntry7] : N/A [FreeTextEntry8] : Routine immunizations needed at pediatrician's office [de-identified] : May use fragrance-free moisturizer (recommend Aquaphor) for dry skin [de-identified] : None needed at this time. [de-identified] : None needed at this time.

## 2025-05-22 NOTE — PATIENT INSTRUCTIONS
[FreeTextEntry1] : Next  follow up appointment  at 10:45AM Please schedule appointments with neurology, neurosurgery, and hematology (Office contact numbers provided). We will contact your pediatrician's office to help coordinate these appointments. Theodore has appointments scheduled for the ear doctor (ENT Dr. Birmingham), repeat hearing test, and Dr. Raymundo (Liver doctor). [FreeTextEntry2] : Patient seen in office today and exercise instructions provided [FreeTextEntry3] : Early intervention is recommended. We will call to make the referral. The early intervention office will contact you to schedule an initial evaluation. If you are sent paperwork, please fill it out and submit it. [FreeTextEntry4] : Continue formula and soybean oil as directed. Continue multivitamins [FreeTextEntry5] : Continue ursodiol, levocarnitine,  [FreeTextEntry6] : N/A [FreeTextEntry7] : N/A [FreeTextEntry8] : Routine immunizations needed at pediatrician's office [de-identified] : May use fragrance-free moisturizer (recommend Aquaphor) for dry skin [de-identified] : None needed at this time. [de-identified] : None needed at this time.

## 2025-05-22 NOTE — PATIENT INSTRUCTIONS
[FreeTextEntry1] : Next  follow up appointment  at 10:45AM Please schedule appointments with neurology, neurosurgery, and hematology (Office contact numbers provided). We will contact your pediatrician's office to help coordinate these appointments. Theodore has appointments scheduled for the ear doctor (ENT Dr. Birmingham), repeat hearing test, and Dr. Raymundo (Liver doctor). [FreeTextEntry2] : Patient seen in office today and exercise instructions provided [FreeTextEntry3] : Early intervention is recommended. We will call to make the referral. The early intervention office will contact you to schedule an initial evaluation. If you are sent paperwork, please fill it out and submit it. [FreeTextEntry4] : Continue formula and soybean oil as directed. Continue multivitamins [FreeTextEntry5] : Continue ursodiol, levocarnitine,  [FreeTextEntry6] : N/A [FreeTextEntry7] : N/A [FreeTextEntry8] : Routine immunizations needed at pediatrician's office [de-identified] : May use fragrance-free moisturizer (recommend Aquaphor) for dry skin [de-identified] : None needed at this time. [de-identified] : None needed at this time.

## 2025-06-17 NOTE — CONSULT LETTER
"  History     Chief Complaint:  Right foot pain     HPI   Abbie Cabello is a 39 year old female who presents with her girlfriend for evaluation of right foot pain. The patient tripped over a drain pipe while in [place] last week. She reports that she was seen in [place], where she was told she had toe dislocations, as well as metatarsal fracture. The toe dislocations were reduced, but the patient has had continued discomfort and swelling, prompting her evaluation here.  She took ibuprofen yesterday but has not been taking any medications regularly. She has not been icing either. There has been no new injury or trauma to the area.    Allergies:  Penicillins     Medications:    Albuterol inhaler  Levothyroxine     Past Medical History:    Thyroid disease  Asthma      Past Surgical History:    Eye surgery   Hernia repair      Family History:    History reviewed. No pertinent family history.      Social History:  Smoking Status: Former Smoker  Alcohol Use: Socially    PCP: Cole Spencer     Review of Systems   Constitutional: Negative for chills and fever.   Musculoskeletal:        Right foot pain   Skin: Negative for rash and wound.      Physical Exam     Patient Vitals for the past 24 hrs:   BP Temp Heart Rate SpO2 Height Weight   07/18/18 1936 127/77 98  F (36.7  C) 73 99 % 1.702 m (5' 7\") 108.4 kg (239 lb)      Physical Exam  General: Alert and interactive.   Eyes: The pupils are equal and round. No scleral icterus.   Neck:Trachea is in the midline       CV: Regular rate. Extremities well perfused.   Resp: Non-labored, no retractions or accessory muscle use.     GI: Abdomen is not distended.   MS: Normal muscle tone. Some swelling to the dorsal aspect of the right foot with pinpoint pain over the second metatarsal. No obvious discoloration. 2+ DP pulse.   Neuro: Alert and oriented x 3. Non-focal examination.    Psych: Awake. Alert.  Normal affect. Appropriate interactions.    Emergency Department " Course   Imaging:  X-ray Right foot, 3 views:  1. A cortical step-off along the lateral aspect of the head of the  right second metatarsal bone, suspicious for an acute fracture.  Recommend clinical correlation.  2. No other findings suspicious for acute fracture, malalignment or  other acute osseous abnormality of the right foot.  Result per radiology.       All imaging results were discussed with the patient who voiced understanding of the findings.    Interventions:  2015 - Ibuprofen 600 mg PO      Emergency Department Course:  Past medical records, nursing notes, and vitals reviewed.  I performed an exam of the patient and obtained history, as documented above.    The patient was sent for a X-ray while in the emergency department, findings above.     I rechecked the patient.  Findings and plan explained to the Patient. Patient discharged home with instructions regarding supportive care, medications, and reasons to return. The importance of close follow-up was reviewed.      Impression & Plan    Medical Decision Making: Abbie Cabello is a 39 year old female who presents for evaluation of right foot pain. Patient has a known metatarsal fracture, which was diagnosed by x-ray in [place], where she stopped on her way home from [place]. The patient presents here for continued pain.  Patient also mentioned that she had some toe dislocations that were reduced in [place]. X-ray here confirms the second metatarsal fracture.  Patient was placed in a post-op boot, and she was encouraged to use Ibuprofen and Tylenol, as well as ice for symptomatic treatment.  I requested that she follow-up with orthopedics in the next week for evaluation.  Otherwise, she is stable for discharge. There is no evidence of compartment syndrome, neurovascular compromise infection, or other injury.    Diagnosis:    ICD-10-CM    1. Closed displaced fracture of second metatarsal bone of right foot, initial encounter S92.321A        7/18/2018  [FreeTextEntry3] : Kim Quezada,  Attending Child Neurologist/Epileptologist   Katarina Dean PA-C    I, Katarina Dean PA-C, interviewed the patient, explained the course of action and discussed the patient with Reece King NP, who then evaluated the patient.    I, Amarjit Ayers, am serving as a scribe at 10:35 PM on 7/18/2018 to document services personally performed by Katarina Dean PA-C, based on my observations and the provider's statements to me.         Katarina Dean PA-C  07/18/18 2917

## 2025-06-17 NOTE — ASSESSMENT
[FreeTextEntry1] : Theodore Duran is a 3 month old, ex-36+6 weeker, male with left occipital hemorrhage, umbilical hernia, ascites of the liver, essential fatty acid deficiency and carnitine deficiency who presents to Neurology for their initial visit with concerns of developmental delay. 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  hypoxic ischemic injury is a possibility amongst other etiologies such as TORCH infection. Correlate clinically). Will obtain fragile X and chromosomal microarray. Discussed that HIE can cause long-term neurodevelopmental impairments, including cerebral palsy, cognitive deficits, and epilepsy so it is imperative to monitor development and progress closely. Since hospital discharge, patient has been doing well per parents without spasticity/difficulty changing, stiffening, unresponsiveness, tongue biting, foaming at the mouth, apnea, cyanosis, choking, head drop, arm flexion, loss of tone, facial twitching, eyelid fluttering, AMS, recent sickness, new medications, supplements, fever, chill, URI symptoms, emesis, diarrhea, rash. Extensive counseling on appropriate developmental milestones and signs/symptoms of spasticity. Extensive counseling on seizure signs/symptoms, seizure types, red flags, seizure precautions, ED/911 usage, and written materials provided on seizure safety, SUDEP, prognosis, IF epilepsy will need daily preventative anti-seizure medication for at least 1-2 years before repeating EEG for consideration of weaning ASMs, emergency abortive medication, and to consider [facility] genetic testing/referral to Genetics. Will consider repeat of MRI Brain after myelination is complete closer to 1-2 years old if concerns of seizures will obtain MRI Brain with epilepsy protocol earlier. Referred to Ophthalmologist for dilated fundoscopic exam to rule out vision issues. Patient seeing Audiology today for failure hearing screen on the left side. Discussed EI evaluation for Speech, OT, and PT. Written resources provided. Referral to Developmental Pediatrician. Follow up with PCP and surgeons regarding hernias. Follow up in 1-2 months, or sooner if symptoms worsen.  I spent a total of 120 minutes on the date of the encounter chart reviewing, evaluating, coordination of care, counseling, and treating the patient.

## 2025-06-17 NOTE — QUALITY MEASURES
[Side effects of anti-seizure medications] : Side effects of anti-seizure medications: Not Applicable [Sudden unexpected death in epilepsy (SUDEP)] : Sudden unexpected death in epilepsy: Not Applicable [Issues around driving] : Issues around driving: Not Applicable [Screening for anxiety, depression] : Screening for anxiety, depression: Not Applicable [Treatment-resistant epilepsy (every visit)] : Treatment-resistant epilepsy (every visit): Not Applicable [Adherence to medication(s)] : Adherence to medication(s): Not Applicable [Counseling for women of childbearing potential with epilepsy (including folic acid supplement)] : Counseling for women of childbearing potential with epilepsy (including folic acid supplement): Not Applicable [Options for adjunctive therapy (Neurostimulation, CBD, Dietary Therapy, Epilepsy Surgery)] : Options for adjunctive therapy (Neurostimulation, CBD, Dietary Therapy, Epilepsy Surgery): Not Applicable [25 Hydroxy Vitamin D level assessed and Vitamin D3 ordered] : 25 Hydroxy Vitamin D level assessed and Vitamin D3 ordered: Not Applicable [Thyroid profile ordered] : Thyroid profile ordered: Not Applicable [Labs for inborn error of metabolism] : Labs for inborn error of metabolism: Not Applicable [Lead screening] : Lead screening: Not Applicable

## 2025-06-17 NOTE — HISTORY OF PRESENT ILLNESS
[FreeTextEntry1] : Theodore Duran is a 3 month old, ex-36+6 weeker, male with left occipital hemorrhage, umbilical hernia, ascites of the liver, essential fatty acid deficiency and carnitine deficiency who presents to Neurology for their initial visit with concerns of developmental delay. Accompanied by parents today.   Parents main concern is that patient has only been vaccinated once in the hospital. 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  hypoxic ischemic injury is a possibility amongst other etiologies such as TORCH infection. Correlate clinically). Parents report patient is "doing great without any delays" as he was premature.  Per father, swelling in testicles and umbilical hernia that is being watching - possible surgical in the future. Patient seeing ENT/Audiology today for failure hearing screen on the left side. Denies fever, chill, URI symptoms, emesis, diarrhea, rash, sick contacts.   Denies unresponsiveness, tongue biting, foaming at the mouth, apnea, cyanosis, choking, spasticity/stiffening, loss of tone, facial twitching, eyelid fluttering, AMS, arm flexion, head drops, developmental regression/stagnation, previous meningitis, head trauma/concussion, neurosurgical procedure, autism, developmental delays, staring episodes, myoclonus, convulsions, waking up with blood on pillow, unexplained myalgias, febrile seizures, history of previous post-ictal Matthew's, status epilepticus or seizure clusters, family history of seizures, autism, or developmental delays.  Patient sleeps in the same room as parents.   Today in the office HC 37.5 cm   Birth History: denies birthmarks Born at 36+6 week to 31 yo  - B+, GBS neg, HIV neg, RPR neg, rub imm, GC/Cl neg. Pregnancy complicated by decreased fetal growth and decreased fetal movement immediately prior to delivery. At delivery infant limp and apneic - received PPV and then admitted to OSH NICU on CPAP 5 40%. Infant escalated and required intubation, mech ventilation and then HFOV and Rosie. Infant required vasopressor support HUS intraparenchymal hemorrhage. Infant transferred to [facility]ICU on DOL 8 for escalation of care.   Birth: weight 1.575 kg.   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 Discharge: weight 2.255 kg, length 45 cm and head circumference 33.5 cm.   Nutrition at NICU discharge: EHM: with Nestle E HA and formula: Neosure 22. Developmental History: per father no concerns "everything is normal, and therapist told parents to just continue moving arms and legs", has reportedly only 1 session for PT and OT on 25, denies Speech or GARETH therapy Past Medical History: left occipital hemorrhage, essential fatty acid deficiency and carnitine deficiency, umbilical hernia, and ascites of the liver per father that was corrected in NICU Past Surgical History: denies Medications: denies Allergies: denies Social History: Lives at home with parents, no siblings School: denies    Family History: Denies family history of seizures, developmental delays, autism, headaches, or other neurological disorders.  Per chart review, according to [facility] 25 note baby has developmental delay for chronologic age, was seen by PT today and given home exercises to do. Baby also has increased upper extremity tone and head lag, irritability and abnormal movement pattern for age with history of intracranial hemorrhage. Early Intervention is recommended, will make referral from NICU clinic. Baby will follow-up with pediatric developmental in 2025 - appointment needed. Theodore also needs follow up with pediatric neurology and neurosurgery.  3/21/25 MRI BRAIN: 1. Confirmation of a 1.3 x 1.2 x 2.5 cm (AP x TRV x CC) acute left occipital parenchymal hematoma. 2. Additional areas of acute subarachnoid and subdural hemorrhage, as discussed. 3. 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  hypoxic ischemic injury is a possibility amongst other etiologies such as TORCH infection. Correlate clinically.  MRA HEAD AND NECK: 1. Motion limited studies. 2. Grossly unremarkable.  MRV HEAD: 1. Motion limited study. 2. Grossly unremarkable.

## 2025-06-17 NOTE — REASON FOR VISIT
[Interpreters_IDNumber] : [id number] [Interpreters_FullName] : Cody [FreeTextEntry3] : [facility] [TWNoteComboBox1] : Vj

## 2025-06-17 NOTE — PHYSICAL EXAM
[Good  bilaterally] : good  bilaterally [Lift head in prone] : lift head in prone [2+ biceps] : 2+ biceps [Knee jerks] : knee jerks [No ankle clonus] : no ankle clonus [Zeny] : Zeny [Grasp] : grasp [Responds to touch and tickle] : responds to touch and tickle [No dysmetria in reaching for objects] : no dysmetria in reaching for objects [de-identified] : microcephalic, slight yellowing of sclera [de-identified] : normal respiratory effort [de-identified] : large (5 x 5 x 3.5 cm) umbilical bulge hernia no discoloration [de-identified] : cortical thumb sign on the left > right [de-identified] : slight inconsistent tracking of face but also being fed, full EOMI, blinks to threat bilaterally [de-identified] : axial hypotonia with some slip through on vertical suspension and headlag on pull to sit but able to turn head to turn while prone, appendicular mildly increased tone [de-identified] : after feeding frequent back arches with leg extension

## 2025-06-17 NOTE — DATA REVIEWED
[FreeTextEntry1] : 3/21/25 MRI BRAIN: 1. Confirmation of a 1.3 x 1.2 x 2.5 cm (AP x TRV x CC) acute left occipital parenchymal hematoma. 2. Additional areas of acute subarachnoid and subdural hemorrhage, as discussed. 3. 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  hypoxic ischemic injury is a possibility amongst other etiologies such as TORCH infection. Correlate clinically.  MRA HEAD AND NECK: 1. Motion limited studies. 2. Grossly unremarkable.  MRV HEAD: 1. Motion limited study. 2. Grossly unremarkable.

## 2025-06-17 NOTE — PLAN
[FreeTextEntry1] : - Monitor for developmental milestones, especially visual tracking and following - Advise parents to monitor for any asymmetry in movement or stiffness of extremities - Counseled on seizure safety and red flags - Recommend continued overnight monitoring - Instruct parents to report any signs of seizures or developmental regression immediately - Referred to Ophthalmologist for dilated fundoscopic exam to rule out vision issues - Will consider repeat of MRI Brain after myelination is complete - Discussed EI for Speech, OT, and PT. Written resources provided.  - Referred to Audiology - Obtain fragile X and chromosomal microarray.  - Follow up with PCP for vaccinations - Follow up with surgeons regarding hernias - Follow up GI/Hepatology for possible reflux, liver, and essential fatty acid deficiency and carnitine deficiency - Referral to Developmental Pediatrician - Follow up in 1-2 months, or sooner if symptoms worsen.

## 2025-06-17 NOTE — REVIEW OF SYSTEMS
[Dec Urine Output] : no oliguria [FreeTextEntry7] : umbilical hernia, ascites of the liver [FreeTextEntry8] : left occipital hemorrhage, essential fatty acid deficiency and carnitine deficiency

## 2025-06-17 NOTE — DEVELOPMENTAL MILESTONES
["OOO/AAH"] : does not "ooo/aah" [Vocalizes] : does not vocalize [Sit-head steady] : no sit-head steady [FreeTextEntry3] : sometimes when watching TV will cry per father, trying to hold up head when sitting per father

## 2025-06-17 NOTE — REASON FOR VISIT
[Interpreters_IDNumber] : [id number] [Interpreters_FullName] : Cody [FreeTextEntry3] : [facility] [TWNoteComboBox1] : Vj

## 2025-06-17 NOTE — PHYSICAL EXAM
[Good  bilaterally] : good  bilaterally [Lift head in prone] : lift head in prone [2+ biceps] : 2+ biceps [Knee jerks] : knee jerks [No ankle clonus] : no ankle clonus [Zeny] : Zeny [Grasp] : grasp [Responds to touch and tickle] : responds to touch and tickle [No dysmetria in reaching for objects] : no dysmetria in reaching for objects [de-identified] : microcephalic, slight yellowing of sclera [de-identified] : normal respiratory effort [de-identified] : large (5 x 5 x 3.5 cm) umbilical bulge hernia no discoloration [de-identified] : cortical thumb sign on the left > right [de-identified] : slight inconsistent tracking of face but also being fed, full EOMI, blinks to threat bilaterally [de-identified] : axial hypotonia with some slip through on vertical suspension and headlag on pull to sit but able to turn head to turn while prone, appendicular mildly increased tone [de-identified] : after feeding frequent back arches with leg extension

## 2025-06-17 NOTE — HISTORY OF PRESENT ILLNESS
[FreeTextEntry1] : Theodore Duran is a 3 month old, ex-36+6 weeker, male with left occipital hemorrhage, umbilical hernia, ascites of the liver, essential fatty acid deficiency and carnitine deficiency who presents to Neurology for their initial visit with concerns of developmental delay. Accompanied by parents today.   Parents main concern is that patient has only been vaccinated once in the hospital. 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  hypoxic ischemic injury is a possibility amongst other etiologies such as TORCH infection. Correlate clinically). Parents report patient is "doing great without any delays" as he was premature.  Per father, swelling in testicles and umbilical hernia that is being watching - possible surgical in the future. Patient seeing ENT/Audiology today for failure hearing screen on the left side. Denies fever, chill, URI symptoms, emesis, diarrhea, rash, sick contacts.   Denies unresponsiveness, tongue biting, foaming at the mouth, apnea, cyanosis, choking, spasticity/stiffening, loss of tone, facial twitching, eyelid fluttering, AMS, arm flexion, head drops, developmental regression/stagnation, previous meningitis, head trauma/concussion, neurosurgical procedure, autism, developmental delays, staring episodes, myoclonus, convulsions, waking up with blood on pillow, unexplained myalgias, febrile seizures, history of previous post-ictal Matthew's, status epilepticus or seizure clusters, family history of seizures, autism, or developmental delays.  Patient sleeps in the same room as parents.   Today in the office HC 37.5 cm   Birth History: denies birthmarks Born at 36+6 week to 31 yo  - B+, GBS neg, HIV neg, RPR neg, rub imm, GC/Cl neg. Pregnancy complicated by decreased fetal growth and decreased fetal movement immediately prior to delivery. At delivery infant limp and apneic - received PPV and then admitted to OSH NICU on CPAP 5 40%. Infant escalated and required intubation, mech ventilation and then HFOV and Rosie. Infant required vasopressor support HUS intraparenchymal hemorrhage. Infant transferred to [facility] on DOL 8 for escalation of care.   Birth: weight 1.575 kg.   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 Discharge: weight 2.255 kg, length 45 cm and head circumference 33.5 cm.   Nutrition at NICU discharge: EHM: with Nestle E HA and formula: Neosure 22. Developmental History: per father no concerns "everything is normal, and therapist told parents to just continue moving arms and legs", has reportedly only 1 session for PT and OT on 25, denies Speech or GARETH therapy Past Medical History: left occipital hemorrhage, essential fatty acid deficiency and carnitine deficiency, umbilical hernia, and ascites of the liver per father that was corrected in NICU Past Surgical History: denies Medications: denies Allergies: denies Social History: Lives at home with parents, no siblings School: denies    Family History: Denies family history of seizures, developmental delays, autism, headaches, or other neurological disorders.  Per chart review, according to Arnoldo 25 note baby has developmental delay for chronologic age, was seen by PT today and given home exercises to do. Baby also has increased upper extremity tone and head lag, irritability and abnormal movement pattern for age with history of intracranial hemorrhage. Early Intervention is recommended, will make referral from NICU clinic. Baby will follow-up with pediatric developmental in 2025 - appointment needed. Theodore also needs follow up with pediatric neurology and neurosurgery.  3/21/25 MRI BRAIN: 1. Confirmation of a 1.3 x 1.2 x 2.5 cm (AP x TRV x CC) acute left occipital parenchymal hematoma. 2. Additional areas of acute subarachnoid and subdural hemorrhage, as discussed. 3. 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  hypoxic ischemic injury is a possibility amongst other etiologies such as TORCH infection. Correlate clinically.  MRA HEAD AND NECK: 1. Motion limited studies. 2. Grossly unremarkable.  MRV HEAD: 1. Motion limited study. 2. Grossly unremarkable.

## 2025-06-23 NOTE — HISTORY OF PRESENT ILLNESS
[PCV 20] : PCV 20 [DTaP/IPV/Hib/HepB] : DTaP/IPV/Hib/HepB [FreeTextEntry1] : Patient tolerated administration of Vaxelis 0.5 ml IM in left thigh as ordered Patient tolerated administration of Prevnar 20 0.5 ml IM left thigh as ordered VIS statement given All questions and concerns addressed. RTO as directed.

## 2025-07-07 NOTE — PLAN
[FreeTextEntry2] : Audiologic monitoring as medically indicated or if a change in hearing is suspected.

## 2025-07-07 NOTE — CONSULT LETTER
[Dear  ___] : Dear  [unfilled], [Consult Letter:] : I had the pleasure of evaluating your patient, [unfilled]. [Please see my note below.] : Please see my note below. [Consult Closing:] : Thank you very much for allowing me to participate in the care of this patient.  If you have any questions, please do not hesitate to contact me. [Sincerely,] : Sincerely, [FreeTextEntry3] : Mega Gonzales CCC-A Audiology Supervisor  Hearing Screening Program [address] [place] [address] [phone]

## 2025-07-07 NOTE — REASON FOR VISIT
[Follow-Up] : follow-up for [Other: _____] : [unfilled]  [Parents] : parents [Medical Records] : medical records

## 2025-07-07 NOTE — HISTORY OF PRESENT ILLNESS
[FreeTextEntry1] : ANDIE is a 3 month old male seen for repeat diagnostic ABR evaluation following failed  hearing screening in the left ear; ABR performed 25 revealed type B tympanograms and elevated thresholds 2-4kHz bilaterally. Family history of hearing loss denied. Born at [facility], transferred to [facility] NICU for complex care. Seen by ENT Dr. [place] 25 at which time cerumen was removed from both ears.

## 2025-07-07 NOTE — ASSESSMENT
[FreeTextEntry1] : ABR is an an objective test that measures brainstem activity in response to acoustic stimuli. It evaluates the integrity of the hearing system from the level of the cochlea through the lower brainstem. From this, we are able to gather data to estimate hearing thresholds. Please note thresholds are reported in dBnHL. Diagnostic statement includes a correction factor of -20 dB at 500Hz. -15 dB at 1000Hz, -10 dB at 2000Hz and -5 dB at 4000Hz.  Results of ABR evaluation consistent with estimated thresholds obtained within normal limits at 2kHz in the left ear and 2-4kHz in the right ear. Testing completed with patient in quiet awake state as he did not sleep throughout scheduled appointment time.  These results suggest significant improvement re: previous evaluation.

## 2025-07-22 NOTE — REASON FOR VISIT
[Abnormal liver enzymes] : abnormal liver enzymes [Mother] : mother [Father] : father [Medical Records] : medical records

## 2025-07-24 NOTE — HISTORY OF PRESENT ILLNESS
[FreeTextEntry1] : [facility] creole  [id number]  Theodore is a 4mo male with PMHx portosystemic venous shunt s/p closure here for follow up of elevated liver enzymes and cholestasis on ursodiol.   Born at 36.6 via C/S at [facility] due to NRNST with prenatal course complicated by fetal growth restriction of <1% and birth complicated by an unknown hypoxic event with early NICU course requiring intubation and vasopressors, leukopenia, anemia, and thrombocytopenia, coagulopathy with prolonged INR, parenchymal brain bleed, and profound direct hyperbilirubinemia.  Initial liver injury most suspicious for shock liver given complicated delivery and need for vasopressors. Other conditions such as GALD, HLH, infectious and anatomic evaluated for and negative. Genetic conditions also entertained and WGS sent 3/19 by genetics/primary team.  Followed initial insult noted direct bilirubin downtrended, with normal GGT and normalized INR. As liver enzymes up-trended over hospital course recommended additional infectious testing to evaluate for secondary causes of hepatitis that return negative. Repeat abdominal US showed enlarging portosystemic shunt in between the middle and left hepatic vein. Ammonia x2 over 100, with prior level 65; INR also with rise from 1.1 to 1.3. Normal saturation of oxygen and echocardiogram with no pulmonary hypertension. Case review with IR for early closure of shunt as patient has complications of shunting of blood from the portal to systemic circulation. Now s/p closure (embolization) of 2 intrahepatic portosystemic shunts on 4/2025. Had downtrended bilirubin, liver enzymes, ammonia and follow up abdominal doppler status post embolization of the previously seen portosystemic shunt with no definite residual shunting identified.   Genetics evaluation revealed a low level of free and total carnitine. 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 This variant was identified in the maternal sample at an apparently homozygous level. With an uncertain variant, neither the patient or his mother can be diagnosed with a mitochondrial condition. Patient started on levocarnitine with unclear reason regarding carnitine deficiency  Then admitted for dehydration and diarrhea in 05/2025 Last ultrasound:  5/6/2025  IMPRESSION: Stable post embolization changes without evidence of residual portosystemic shunting. The portal and hepatic veins are patent.  Most recent labs before discharge from NICU 04/2025: AST: 313 ALT: 246 Bili: 9/5.9 05/2025 AST: 214 ALT: 238 Bili: 7.2/5.7  Since discharge:  Constipation and vomiting (no blood in the stool) with Extensive HA formula from NICU, went to hospital and switched to Nutramigen since Thursday of last week, feeding every 3h hr will drink up to 4x a day, Nutramigen 120ml; Normally 4-5 dirty diapers, 5-6 wet diapers. He sleeps well and is generally happy.   Medications: ursodiol 20mg/ml 1ml BID since NICU d/c for 30d, levocarnitine No bleeding that parents have noticed, no extra fussiness that seems due to pain, no abdominal distension, no jaundice, no acholic stools, no rash.  Gaining weight and growing in the 13% weight/length.   Awaiting umbilical hernia repair by surgery.

## 2025-07-24 NOTE — PHYSICAL EXAM
[Well Developed] : well developed [Alert and Active] : alert and active [Soft] : soft [No HSM] : no hepatosplenomegaly appreciated [Normal Tone] : normal tone [Well-Perfused] : well-perfused [icteric] : anicteric [Respiratory Distress] : no respiratory distress  [Distended] : non distended [Tender] : non tender [Jaundice] : no jaundice [de-identified] : large umbilical hernia

## 2025-07-24 NOTE — PHYSICAL EXAM
[Well Developed] : well developed [Alert and Active] : alert and active [Soft] : soft [No HSM] : no hepatosplenomegaly appreciated [Normal Tone] : normal tone [Well-Perfused] : well-perfused [icteric] : anicteric [Respiratory Distress] : no respiratory distress  [Distended] : non distended [Tender] : non tender [Jaundice] : no jaundice [de-identified] : large umbilical hernia

## 2025-07-24 NOTE — PHYSICAL EXAM
[Well Developed] : well developed [Alert and Active] : alert and active [Soft] : soft [No HSM] : no hepatosplenomegaly appreciated [Normal Tone] : normal tone [Well-Perfused] : well-perfused [icteric] : anicteric [Respiratory Distress] : no respiratory distress  [Distended] : non distended [Tender] : non tender [Jaundice] : no jaundice [de-identified] : large umbilical hernia

## 2025-07-24 NOTE — REVIEW OF SYSTEMS
[Fever] : no fever [Icteric Sclera] : no icteric sclera [Shortness Of Breath] : no shortness of breath [Edema] : no edema [Bruising] : no bruising [Diabetes] : no diabetes [Jaundice] : no jaundice

## 2025-07-24 NOTE — HISTORY OF PRESENT ILLNESS
[FreeTextEntry1] : [facility] creole  [id number]  Theodore is a 4mo male with PMHx portosystemic venous shunt s/p closure here for follow up of elevated liver enzymes and cholestasis on ursodiol.   Born at 36.6 via C/S at [facility] due to NRNST with prenatal course complicated by fetal growth restriction of <1% and birth complicated by an unknown hypoxic event with early NICU course requiring intubation and vasopressors, leukopenia, anemia, and thrombocytopenia, coagulopathy with prolonged INR, parenchymal brain bleed, and profound direct hyperbilirubinemia.  Initial liver injury most suspicious for shock liver given complicated delivery and need for vasopressors. Other conditions such as GALD, HLH, infectious and anatomic evaluated for and negative. Genetic conditions also entertained and WGS sent 3/19 by genetics/primary team.  Followed initial insult noted direct bilirubin downtrended, with normal GGT and normalized INR. As liver enzymes up-trended over hospital course recommended additional infectious testing to evaluate for secondary causes of hepatitis that return negative. Repeat abdominal US showed enlarging portosystemic shunt in between the middle and left hepatic vein. Ammonia x2 over 100, with prior level 65; INR also with rise from 1.1 to 1.3. Normal saturation of oxygen and echocardiogram with no pulmonary hypertension. Case review with IR for early closure of shunt as patient has complications of shunting of blood from the portal to systemic circulation. Now s/p closure (embolization) of 2 intrahepatic portosystemic shunts on 4/2025. Had downtrended bilirubin, liver enzymes, ammonia and follow up abdominal doppler status post embolization of the previously seen portosystemic shunt with no definite residual shunting identified.   Genetics evaluation revealed a low level of free and total carnitine. 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 This variant was identified in the maternal sample at an apparently homozygous level. With an uncertain variant, neither the patient or his mother can be diagnosed with a mitochondrial condition. Patient started on levocarnitine with unclear reason regarding carnitine deficiency  Then admitted for dehydration and diarrhea in 05/2025 Last ultrasound:  5/6/2025  IMPRESSION: Stable post embolization changes without evidence of residual portosystemic shunting. The portal and hepatic veins are patent.  Most recent labs before discharge from NICU 04/2025: AST: 313 ALT: 246 Bili: 9/5.9 05/2025 AST: 214 ALT: 238 Bili: 7.2/5.7  Since discharge:  Constipation and vomiting (no blood in the stool) with Extensive HA formula from NICU, went to hospital and switched to Nutramigen since Thursday of last week, feeding every 3h hr will drink up to 4x a day, Nutramigen 120ml; Normally 4-5 dirty diapers, 5-6 wet diapers. He sleeps well and is generally happy.   Medications: ursodiol 20mg/ml 1ml BID since NICU d/c for 30d, levocarnitine No bleeding that parents have noticed, no extra fussiness that seems due to pain, no abdominal distension, no jaundice, no acholic stools, no rash.  Gaining weight and growing in the 13% weight/length.   Awaiting umbilical hernia repair by surgery.

## 2025-07-24 NOTE — ASSESSMENT
[Educated Patient & Family about Diagnosis] : educated the patient and family about the diagnosis [Discussed with Family to Call in ____ week(s) for Test Results] : discussed with family to call in [unfilled] week(s) to obtain test results and with update on child's condition.  Family should call sooner if clinically indicated. [FreeTextEntry1] : Theodore is a 4mo male with PMHx portosystemic venous shunt s/p closure here for follow up of elevated liver enzymes and cholestasis on ursodiol.  Since shunt closure bilirubin and liver enzymes have slowly trended down. On physical exam reassuring he does not appear jaundice and there is no hepatosplenomegaly; in addition, he is growing along his 13% (weight-length). For now no further evaluation needed. Will continue ursodiol until bilirubin normalizes.   Will need follow up in 2-3 months.  I spent 50 minutes reviewing the patient's diagnostic tests, examining the patient, discussing the next steps for treatment plan, adding additional orders for labs and documenting in the patient's medical record.

## 2025-07-24 NOTE — HISTORY OF PRESENT ILLNESS
[FreeTextEntry1] : [facility] creole  [id number]  Theodore is a 4mo male with PMHx portosystemic venous shunt s/p closure here for follow up of elevated liver enzymes and cholestasis on ursodiol.   Born at 36.6 via C/S at [facility] due to NRNST with prenatal course complicated by fetal growth restriction of <1% and birth complicated by an unknown hypoxic event with early NICU course requiring intubation and vasopressors, leukopenia, anemia, and thrombocytopenia, coagulopathy with prolonged INR, parenchymal brain bleed, and profound direct hyperbilirubinemia.  Initial liver injury most suspicious for shock liver given complicated delivery and need for vasopressors. Other conditions such as GALD, HLH, infectious and anatomic evaluated for and negative. Genetic conditions also entertained and WGS sent 3/19 by genetics/primary team.  Followed initial insult noted direct bilirubin downtrended, with normal GGT and normalized INR. As liver enzymes up-trended over hospital course recommended additional infectious testing to evaluate for secondary causes of hepatitis that return negative. Repeat abdominal US showed enlarging portosystemic shunt in between the middle and left hepatic vein. Ammonia x2 over 100, with prior level 65; INR also with rise from 1.1 to 1.3. Normal saturation of oxygen and echocardiogram with no pulmonary hypertension. Case review with IR for early closure of shunt as patient has complications of shunting of blood from the portal to systemic circulation. Now s/p closure (embolization) of 2 intrahepatic portosystemic shunts on 4/2025. Had downtrended bilirubin, liver enzymes, ammonia and follow up abdominal doppler status post embolization of the previously seen portosystemic shunt with no definite residual shunting identified.   Genetics evaluation revealed a low level of free and total carnitine. 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 This variant was identified in the maternal sample at an apparently homozygous level. With an uncertain variant, neither the patient or his mother can be diagnosed with a mitochondrial condition. Patient started on levocarnitine with unclear reason regarding carnitine deficiency  Then admitted for dehydration and diarrhea in 05/2025 Last ultrasound:  5/6/2025  IMPRESSION: Stable post embolization changes without evidence of residual portosystemic shunting. The portal and hepatic veins are patent.  Most recent labs before discharge from NICU 04/2025: AST: 313 ALT: 246 Bili: 9/5.9 05/2025 AST: 214 ALT: 238 Bili: 7.2/5.7  Since discharge:  Constipation and vomiting (no blood in the stool) with Extensive HA formula from NICU, went to hospital and switched to Nutramigen since Thursday of last week, feeding every 3h hr will drink up to 4x a day, Nutramigen 120ml; Normally 4-5 dirty diapers, 5-6 wet diapers. He sleeps well and is generally happy.   Medications: ursodiol 20mg/ml 1ml BID since NICU d/c for 30d, levocarnitine No bleeding that parents have noticed, no extra fussiness that seems due to pain, no abdominal distension, no jaundice, no acholic stools, no rash.  Gaining weight and growing in the 13% weight/length.   Awaiting umbilical hernia repair by surgery.

## 2025-07-30 NOTE — BEGINNING OF VISIT
[] :  [Language Line ] : provided by [employer]   [Time Spent: ____ minutes] : Total time spent using  services: [unfilled] minutes. The patient's primary language is not English thus required  services. [Interpreters_IDNumber] : [id number] [Interpreters_FullName] : German [TWNoteComboBox1] : Vj